# Patient Record
Sex: FEMALE | Race: WHITE | NOT HISPANIC OR LATINO | Employment: FULL TIME | URBAN - METROPOLITAN AREA
[De-identification: names, ages, dates, MRNs, and addresses within clinical notes are randomized per-mention and may not be internally consistent; named-entity substitution may affect disease eponyms.]

---

## 2023-09-06 ENCOUNTER — HOSPITAL ENCOUNTER (OUTPATIENT)
Dept: DATA CONVERSION | Facility: HOSPITAL | Age: 28
Discharge: HOME | End: 2023-09-06
Payer: COMMERCIAL

## 2023-09-06 DIAGNOSIS — M35.9 SYSTEMIC INVOLVEMENT OF CONNECTIVE TISSUE, UNSPECIFIED (MULTI): ICD-10-CM

## 2023-09-06 DIAGNOSIS — R76.8 OTHER SPECIFIED ABNORMAL IMMUNOLOGICAL FINDINGS IN SERUM: ICD-10-CM

## 2023-09-06 DIAGNOSIS — M06.09 RHEUMATOID ARTHRITIS WITHOUT RHEUMATOID FACTOR, MULTIPLE SITES (MULTI): ICD-10-CM

## 2023-09-06 LAB
ALBUMIN SERPL-MCNC: 4.3 GM/DL (ref 3.5–5)
ALBUMIN/GLOB SERPL: 1.5 RATIO (ref 1.5–3)
ALP BLD-CCNC: 76 U/L (ref 35–125)
ALT SERPL-CCNC: 68 U/L (ref 5–40)
ANION GAP SERPL CALCULATED.3IONS-SCNC: 14 MMOL/L (ref 0–19)
AST SERPL-CCNC: 41 U/L (ref 5–40)
BACTERIA UR QL AUTO: POSITIVE
BASOPHILS # BLD AUTO: 0.03 K/UL (ref 0–0.22)
BASOPHILS NFR BLD AUTO: 0.4 % (ref 0–1)
BILIRUB SERPL-MCNC: 0.4 MG/DL (ref 0.1–1.2)
BILIRUB UR QL STRIP.AUTO: NEGATIVE
BUN SERPL-MCNC: 10 MG/DL (ref 8–25)
BUN/CREAT SERPL: 14.3 RATIO (ref 8–21)
C3 SERPL-MCNC: 160 MG/DL (ref 68–260)
C4 SERPL-MCNC: 33 MG/DL (ref 16–64)
CALCIUM SERPL-MCNC: 9.4 MG/DL (ref 8.5–10.4)
CHLORIDE SERPL-SCNC: 104 MMOL/L (ref 97–107)
CK SERPL-CCNC: 78 U/L (ref 24–195)
CLARITY UR: CLEAR
CO2 SERPL-SCNC: 23 MMOL/L (ref 24–31)
COLOR UR: YELLOW
CREAT SERPL-MCNC: 0.7 MG/DL (ref 0.4–1.6)
CREAT UR-MCNC: 276.9 MG/DL
CRP SERPL-MCNC: 0.3 MG/DL (ref 0–2)
DEPRECATED RDW RBC AUTO: 38.5 FL (ref 37–54)
DIFFERENTIAL METHOD BLD: ABNORMAL
EOSINOPHIL # BLD AUTO: 0.1 K/UL (ref 0–0.45)
EOSINOPHIL NFR BLD: 1.3 % (ref 0–3)
ERYTHROCYTE [DISTWIDTH] IN BLOOD BY AUTOMATED COUNT: 12.3 % (ref 11.7–15)
ERYTHROCYTE [SEDIMENTATION RATE] IN BLOOD BY WESTERGREN METHOD: 13 MM/HR (ref 0–20)
GFR SERPL CREATININE-BSD FRML MDRD: 120 ML/MIN/1.73 M2
GLOBULIN SER-MCNC: 2.9 G/DL (ref 1.9–3.7)
GLUCOSE SERPL-MCNC: 88 MG/DL (ref 65–99)
GLUCOSE UR STRIP.AUTO-MCNC: NEGATIVE MG/DL
HCT VFR BLD AUTO: 43.3 % (ref 36–44)
HGB BLD-MCNC: 14.8 GM/DL (ref 12–15)
HGB UR QL STRIP.AUTO: 7 /HPF (ref 0–3)
HGB UR QL: NEGATIVE
HYALINE CASTS UR QL AUTO: 9 /LPF
IMM GRANULOCYTES # BLD AUTO: 0.03 K/UL (ref 0–0.1)
KETONES UR QL STRIP.AUTO: NEGATIVE
LEUKOCYTE ESTERASE UR QL STRIP.AUTO: NEGATIVE
LYMPHOCYTES # BLD AUTO: 3.22 K/UL (ref 1.2–3.2)
LYMPHOCYTES NFR BLD MANUAL: 41.9 % (ref 20–40)
MCH RBC QN AUTO: 29.4 PG (ref 26–34)
MCHC RBC AUTO-ENTMCNC: 34.2 % (ref 31–37)
MCV RBC AUTO: 86.1 FL (ref 80–100)
MICROALBUMIN UR-MCNC: 33 MG/L (ref 0–23)
MICROALBUMIN/CREAT UR: 11.9 MG/G (ref 0–30)
MICROSCOPIC (UA): ABNORMAL
MONOCYTES # BLD AUTO: 0.71 K/UL (ref 0–0.8)
MONOCYTES NFR BLD MANUAL: 9.2 % (ref 0–8)
NEUTROPHILS # BLD AUTO: 3.6 K/UL
NEUTROPHILS # BLD AUTO: 3.6 K/UL (ref 1.8–7.7)
NEUTROPHILS.IMMATURE NFR BLD: 0.4 % (ref 0–1)
NEUTS SEG NFR BLD: 46.8 % (ref 50–70)
NITRITE UR QL STRIP.AUTO: NEGATIVE
NRBC BLD-RTO: 0 /100 WBC
PH UR STRIP.AUTO: 6.5 [PH] (ref 4.6–8)
PLATELET # BLD AUTO: 236 K/UL (ref 150–450)
PMV BLD AUTO: 10.7 CU (ref 7–12.6)
POTASSIUM SERPL-SCNC: 4.1 MMOL/L (ref 3.4–5.1)
PROT SERPL-MCNC: 7.2 G/DL (ref 5.9–7.9)
PROT UR STRIP.AUTO-MCNC: 70 MG/DL
RBC # BLD AUTO: 5.03 M/UL (ref 4–4.9)
REF LAB TEST RESULTS: NORMAL
SODIUM SERPL-SCNC: 141 MMOL/L (ref 133–145)
SP GR UR STRIP.AUTO: 1.03 (ref 1–1.03)
SQUAMOUS UR QL AUTO: ABNORMAL /HPF
URINE CULTURE: ABNORMAL
UROBILINOGEN UR QL STRIP.AUTO: 2 MG/DL (ref 0–1)
WBC # BLD AUTO: 7.7 K/UL (ref 4.5–11)
WBC #/AREA URNS AUTO: 3 /HPF (ref 0–3)

## 2023-09-07 LAB — DSDNA AB SER IA-ACNC: NORMAL [IU]/ML

## 2023-09-09 LAB
HERPES SIMPLEX VIRUS 1 IGG: ABNORMAL
HERPES SIMPLEX VIRUS 2 IGG: ABNORMAL
Lab: ABNORMAL
Lab: ABNORMAL

## 2023-10-11 ENCOUNTER — APPOINTMENT (OUTPATIENT)
Dept: PRIMARY CARE | Facility: CLINIC | Age: 28
End: 2023-10-11
Payer: COMMERCIAL

## 2023-10-16 ENCOUNTER — ANCILLARY PROCEDURE (OUTPATIENT)
Dept: RADIOLOGY | Facility: CLINIC | Age: 28
End: 2023-10-16
Payer: COMMERCIAL

## 2023-10-16 ENCOUNTER — APPOINTMENT (OUTPATIENT)
Dept: RADIOLOGY | Facility: CLINIC | Age: 28
End: 2023-10-16
Payer: COMMERCIAL

## 2023-10-16 DIAGNOSIS — R10.9 UNSPECIFIED ABDOMINAL PAIN: ICD-10-CM

## 2023-10-16 PROCEDURE — 76770 US EXAM ABDO BACK WALL COMP: CPT | Performed by: RADIOLOGY

## 2023-10-16 PROCEDURE — 76770 US EXAM ABDO BACK WALL COMP: CPT

## 2023-11-10 NOTE — PROGRESS NOTES
Chanel Mata female   1995 28 y.o.   [id number]      Chief Complaint    Follow-up          HPI  Chanel Mata is a 28 y.o.  [facility] followed in the [facility] for breast fibroadenomas. 2023 she was seen in the Emergency Department visit for lupus flare up and CT was performed noting incidental breast masses which prompted breast imaging. 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 She has intermittent breast dull aching pain. She notes right nipple skin cyst/pore with occasional white discharge. She has family history of breast cancer in paternal aunt in her late 40s, recurrence in her 60s.    BREAST IMAGIN2023 bilateral diagnostic mammogram at [facility], BI-RADS Category 0, bilateral breast masses.  2023 bilateral breast ultrasound at [facility], BI-RADS Category 3, right breast 9:00 mid depth 1.4 x 1.4 x 0.8cm probable benign fibroadenoma, left breast 9:00 mid depth 1.9 x 1.2 x 0.5cm probable benign fibroadenoma, follow up bilateral ultrasound is recommended    REPRODUCTIVE HISTORY: menarche age 6m nullipara, premenopausal with PCOS, current OCPs, LMP 2023, 3 right benign excisions for juvenile fibroadenomas    FAMILY CANCER HISTORY  Paternal aunt: breast cancer in her late 40s, recurrence in her 60s.  Father: HSV related throat cance      REVIEW OF SYSTEMS    Constitutional:  Negative for appetite change, fatigue, fever and unexpected weight change.   HENT:  Negative for ear pain, hearing loss, nosebleeds, sore throat and trouble swallowing.    Eyes:  Negative for discharge, itching and visual disturbance.   Respiratory:  Negative for cough, chest tightness and shortness of breath.    Cardiovascular:  Negative for chest pain, palpitations and leg swelling.   Breast: as indicated in HPI  Gastrointestinal:  Negative for abdominal pain, constipation, diarrhea and nausea.    Endocrine: Negative for cold intolerance and heat intolerance.   Genitourinary:  Negative for dysuria, frequency, hematuria, pelvic pain and vaginal bleeding.   Musculoskeletal:  Negative for arthralgias, back pain, gait problem, joint swelling and myalgias.   Skin:  Negative for color change and rash.   Allergic/Immunologic: Negative for environmental allergies and food allergies.   Neurological:  Negative for dizziness, tremors, speech difficulty, weakness, numbness and headaches.   Hematological:  Does not bruise/bleed easily.   Psychiatric/Behavioral:  Negative for agitation, dysphoric mood and sleep disturbance. The patient is not nervous/anxious.         MEDICATIONS  Current Outpatient Medications   Medication Instructions    Apri 0.15-0.03 mg tablet 1 tablet, oral, Daily    dextromethorphan HBr/bupropion (AUVELITY ORAL) oral    hydrOXYzine HCL (ATARAX) 25 mg, oral, As needed    omeprazole (PriLOSEC) 40 mg DR capsule Every 24 hours    PlaqueniL 200 mg tablet 200 mg Orally twice daily for 90 days    prazosin (Minipress) 1 mg capsule Every 24 hours    predniSONE (DELTASONE) 10 mg, oral, Daily    propranolol (INDERAL) 10 mg, oral, 2 times daily PRN    Vyvanse 60 mg capsule Every 24 hours        ALLERGIES  Allergies   Allergen Reactions    Ciprofloxacin Other     tendinitis    Doxycycline Rash    Penicillins Rash    Sulfa (Sulfonamide Antibiotics) Rash        SOCIAL HISTORY    No family history on file.      Social History     Tobacco Use    Smoking status: Never    Smokeless tobacco: Not on file   Substance Use Topics    Alcohol use: Never        VITALS  Vitals:    11/14/23 1414   BP: (!) 140/106   Pulse: (!) 114        PHYSICAL EXAM  Patient is alert and oriented x3, with appropriate mood. Her gait is steady and hand grasps are equal. Sclera clear. The breasts are large and fairly symmetrical. The right breast with 3 healed incisions, superior central, central lateral near areola and partial circumareolar. I  am not able to appreciate any measurable masses however she does have breast tenderness central lateral of bilateral breasts. The left tissue is soft without palpable abnormalities, discrete nodules or masses. The skin and nipples appear normal, no skin cyst noted, no nipple discharge. There is no cervical, supraclavicular, or axillary lymphadenopathy palpable. Heart rate and rhythm normal, S1 and S2 appreciated. The lungs are clear bilaterally. Abdomen is soft & non-tender.    Physical Exam  Chest:            IMAGING  BI US breast limited bilateral 11/14/2023    Narrative  Interpreted By:  Harleen Mariano,  STUDY:  BI US BREAST LIMITED BILATERAL;  11/14/2023 2:06 pm    ACCESSION NUMBER(S):  [id number]    ORDERING CLINICIAN:  ANDREW OCONNOR    INDICATION:  Patient presents for a short-term follow-up of 2 probably benign  right breast masses and 2 probably benign left breast masses.    COMPARISON:  05/12/2023    FINDINGS:  Targeted ultrasound was performed. In the right breast at the 5  o'clock position 6 cm from the nipple there is an oval circumscribed  hypoechoic mass. The mass measures 1.4 x 0.4 by 0.6 cm. It is soft  with elastography. There is minimal internal vascularity. This mass  is stable. Targeted ultrasound of the right breast at the 4 o'clock  position 9 cm from the nipple demonstrates fibroglandular and fatty  breast tissue. There are no suspicious findings. The previously noted  mass is not seen.    In the right breast at the 8:30 o'clock position 5 cm from the nipple  there is an oval circumscribed hypoechoic mass. The mass measures 1.3  x 0.9 x 1 cm. It is soft with elastography and there is no internal  vascularity. The mass is stable. The previously noted mass in the  left breast at the 8:30 o'clock position 5 cm from the nipple is no  longer seen.    Impression  Probably benign bilateral breast masses. Recommendation is for a 6  month follow-up.    BI-RADS Category:  3 Probably  Benign.  Recommendation:  Short Interval Follow-up.  Recommended Date:  6 Months.  Laterality:  Bilateral    Time was spent viewing digital images of the radiology testing with the patient. I explained the results in depth, along with suggested explanation for follow up recommendations based on the testing results.        ORDERS  Orders Placed This Encounter   Procedures    BI US breast complete bilateral     Standing Status:   Future     Standing Expiration Date:   1/14/2025     Order Specific Question:   Reason for exam:     Answer:   l & r follow up     Order Specific Question:   Radiologist to Determine Optimal Study     Answer:   Yes     Order Specific Question:   Release result to Warp [employer]     Answer:   Immediate [1]     Order Specific Question:   Is this exam part of a Research Study? If Yes, link this order to the research study     Answer:   No           ASSESSMENT/PLAN  1. Fibroadenoma of both breasts  BI US breast complete bilateral    Clinic Appointment Request           Follow up in about 6 months (around 5/14/2024), or with bilateral u/s.      Delisa Marvin, [facility]

## 2023-11-12 ENCOUNTER — APPOINTMENT (OUTPATIENT)
Dept: RADIOLOGY | Facility: HOSPITAL | Age: 28
End: 2023-11-12
Payer: COMMERCIAL

## 2023-11-12 ENCOUNTER — HOSPITAL ENCOUNTER (EMERGENCY)
Facility: HOSPITAL | Age: 28
Discharge: HOME | End: 2023-11-12
Attending: EMERGENCY MEDICINE
Payer: COMMERCIAL

## 2023-11-12 VITALS
SYSTOLIC BLOOD PRESSURE: 141 MMHG | WEIGHT: 225 LBS | TEMPERATURE: 97.3 F | BODY MASS INDEX: 38.41 KG/M2 | OXYGEN SATURATION: 99 % | DIASTOLIC BLOOD PRESSURE: 109 MMHG | HEART RATE: 97 BPM | RESPIRATION RATE: 16 BRPM | HEIGHT: 64 IN

## 2023-11-12 DIAGNOSIS — R00.0 TACHYCARDIA: Primary | ICD-10-CM

## 2023-11-12 LAB
ALBUMIN SERPL-MCNC: 4 G/DL (ref 3.5–5)
ALP BLD-CCNC: 83 U/L (ref 35–125)
ALT SERPL-CCNC: 20 U/L (ref 5–40)
ANION GAP SERPL CALC-SCNC: 12 MMOL/L
AST SERPL-CCNC: 22 U/L (ref 5–40)
BASOPHILS # BLD AUTO: 0.06 X10*3/UL (ref 0–0.1)
BASOPHILS NFR BLD AUTO: 0.7 %
BILIRUB SERPL-MCNC: <0.2 MG/DL (ref 0.1–1.2)
BUN SERPL-MCNC: 13 MG/DL (ref 8–25)
CALCIUM SERPL-MCNC: 9.3 MG/DL (ref 8.5–10.4)
CHLORIDE SERPL-SCNC: 105 MMOL/L (ref 97–107)
CO2 SERPL-SCNC: 22 MMOL/L (ref 24–31)
CREAT SERPL-MCNC: 0.8 MG/DL (ref 0.4–1.6)
D DIMER PPP FEU-MCNC: 0.2 MG/L FEU (ref 0.19–0.5)
EOSINOPHIL # BLD AUTO: 0.16 X10*3/UL (ref 0–0.7)
EOSINOPHIL NFR BLD AUTO: 1.9 %
ERYTHROCYTE [DISTWIDTH] IN BLOOD BY AUTOMATED COUNT: 12.5 % (ref 11.5–14.5)
GFR SERPL CREATININE-BSD FRML MDRD: >90 ML/MIN/1.73M*2
GLUCOSE SERPL-MCNC: 93 MG/DL (ref 65–99)
HCG UR QL IA.RAPID: NEGATIVE
HCT VFR BLD AUTO: 43.4 % (ref 36–46)
HGB BLD-MCNC: 14.9 G/DL (ref 12–16)
IMM GRANULOCYTES # BLD AUTO: 0.02 X10*3/UL (ref 0–0.7)
IMM GRANULOCYTES NFR BLD AUTO: 0.2 % (ref 0–0.9)
LYMPHOCYTES # BLD AUTO: 4.23 X10*3/UL (ref 1.2–4.8)
LYMPHOCYTES NFR BLD AUTO: 49.2 %
MCH RBC QN AUTO: 29.9 PG (ref 26–34)
MCHC RBC AUTO-ENTMCNC: 34.3 G/DL (ref 32–36)
MCV RBC AUTO: 87 FL (ref 80–100)
MONOCYTES # BLD AUTO: 0.75 X10*3/UL (ref 0.1–1)
MONOCYTES NFR BLD AUTO: 8.7 %
NEUTROPHILS # BLD AUTO: 3.38 X10*3/UL (ref 1.2–7.7)
NEUTROPHILS NFR BLD AUTO: 39.3 %
NRBC BLD-RTO: 0 /100 WBCS (ref 0–0)
PLATELET # BLD AUTO: 301 X10*3/UL (ref 150–450)
POTASSIUM SERPL-SCNC: 4.3 MMOL/L (ref 3.4–5.1)
PROT SERPL-MCNC: 7.4 G/DL (ref 5.9–7.9)
RBC # BLD AUTO: 4.99 X10*6/UL (ref 4–5.2)
SODIUM SERPL-SCNC: 139 MMOL/L (ref 133–145)
TSH SERPL DL<=0.05 MIU/L-ACNC: 2.91 MIU/L (ref 0.27–4.2)
WBC # BLD AUTO: 8.6 X10*3/UL (ref 4.4–11.3)

## 2023-11-12 PROCEDURE — 99283 EMERGENCY DEPT VISIT LOW MDM: CPT | Mod: 25 | Performed by: EMERGENCY MEDICINE

## 2023-11-12 PROCEDURE — 85300 ANTITHROMBIN III ACTIVITY: CPT | Performed by: EMERGENCY MEDICINE

## 2023-11-12 PROCEDURE — 81025 URINE PREGNANCY TEST: CPT | Performed by: EMERGENCY MEDICINE

## 2023-11-12 PROCEDURE — 36415 COLL VENOUS BLD VENIPUNCTURE: CPT

## 2023-11-12 PROCEDURE — 84443 ASSAY THYROID STIM HORMONE: CPT | Performed by: EMERGENCY MEDICINE

## 2023-11-12 PROCEDURE — 80053 COMPREHEN METABOLIC PANEL: CPT

## 2023-11-12 PROCEDURE — 99285 EMERGENCY DEPT VISIT HI MDM: CPT | Performed by: EMERGENCY MEDICINE

## 2023-11-12 PROCEDURE — 85025 COMPLETE CBC W/AUTO DIFF WBC: CPT

## 2023-11-12 ASSESSMENT — PAIN SCALES - GENERAL: PAINLEVEL_OUTOF10: 0 - NO PAIN

## 2023-11-12 ASSESSMENT — PAIN - FUNCTIONAL ASSESSMENT: PAIN_FUNCTIONAL_ASSESSMENT: 0-10

## 2023-11-12 ASSESSMENT — LIFESTYLE VARIABLES
REASON UNABLE TO ASSESS: NO
HAVE YOU EVER FELT YOU SHOULD CUT DOWN ON YOUR DRINKING: NO
HAVE PEOPLE ANNOYED YOU BY CRITICIZING YOUR DRINKING: NO
EVER HAD A DRINK FIRST THING IN THE MORNING TO STEADY YOUR NERVES TO GET RID OF A HANGOVER: NO
EVER FELT BAD OR GUILTY ABOUT YOUR DRINKING: NO

## 2023-11-13 NOTE — DISCHARGE INSTRUCTIONS
You had a very high heart rate today.  Your heart rate was in the 150s.  You should follow-up with your heart doctor tomorrow.  Give them a call.  Let them know that there are tests available in the ER from your visit tonight.    You did not complete your evaluation for fast heart rate.  Return at any time if you want to complete that evaluation, you are unable to see your heart doctor immediately or if your symptoms get worse including continued fast heart rate, chest pain, difficulty breathing, fever or new symptoms.

## 2023-11-13 NOTE — ED PROVIDER NOTES
HPI   Chief Complaint   Patient presents with    Rapid Heart Rate       -year-old female presents with 2 days of rapid heart rate.  Started yesterday.  Did not measure her heart rate.  This morning patient did not feel well.  And put on her Apple Watch.  Apple Watch said her heart rate has been 150 all day.  No history of palpitations or tachycardia.  No chest pain or shortness of breath.  No fever.  No cough or wheezing.  Patient recently had upper respiratory infection that was treated with azithromycin about a week ago.  All symptoms resolved.    No nausea vomiting or diarrhea.  No abdominal pain.  No vaginal rectal bleeding.  No anticoagulation.    She does not smoke or use cocaine.  She has ADHD and takes Vyvanse.  Did not take her Vyvanse today because she felt the tachycardia.    No history of PE or DVT.  No risk factors for for DVT or PE including recent surgery, immobilization or cancer.    History of thyroid disease.  No neck pain or swelling.    Symptoms feel better now.  Did not take anything for her symptoms.                          Arelis Coma Scale Score: 15                  Patient History   Past Medical History:   Diagnosis Date    Chronic post-traumatic stress disorder     Personal history of other mental and behavioral disorders 08/23/2017    History of anxiety disorder    Personal history of other specified conditions 08/23/2017    History of headache     Past Surgical History:   Procedure Laterality Date    BREAST LUMPECTOMY  06/21/2018    Breast Surgery Lumpectomy    INNER EAR SURGERY  08/23/2017    Inner Ear Surgery    TONSILLECTOMY  08/23/2017    Tonsillectomy     No family history on file.  Social History     Tobacco Use    Smoking status: Never    Smokeless tobacco: Not on file   Substance Use Topics    Alcohol use: Never    Drug use: Never       Physical Exam   ED Triage Vitals   Temp Heart Rate Resp BP   11/12/23 1955 11/12/23 1956 11/12/23 1955 11/12/23 1956   36.3 °C (97.3 °F) 103 18  (!) 141/109      SpO2 Temp Source Heart Rate Source Patient Position   11/12/23 1956 11/12/23 1955 11/12/23 1955 --   100 % Temporal Monitor       BP Location FiO2 (%)     -- --             Physical Exam  Vitals and nursing note reviewed.     Constitutional: Well appearing and hydrated. Awake & alert. No acute distress.  Head: Atraumatic.  Eyes: Sclerae are anicteric and not injected.  ENT: Airway is patent.  Oropharynx clear  Neck: Neck is supple and non-tender. No stridor.  No anterior neck tenderness or mass.  Cardiovascular: Heart rate 103.  Increased to 105 with sitting up.  Pulmonary/Chest: Clear to auscultation bilaterally. No distress.  Normal work of breathing.  No acute tachypnea.  100% on room air, no hypoxia  GI: Soft and non-distended. There is no discomfort with palpation.   Extremities: Full range of motion in all extremities and no deformity.  No calf tenderness or swelling.  Neurological: Alert, awake.  Moving all extremities.  Skin: Skin is warm and dry.  Psychiatric: Cooperative.    EKG:  interpreted by me, Emergency Department Physician    1.  Sinus tachycardia 105, no ST elevations, no prior    ED Course & MDM   ED Course as of 11/13/23 0642   Cath[place]in Nov 12, 2023 2205 Discussed all test results available patient.  Patient does not want to continue evaluation.  Patient and mother are agreement with going home.  She will follow-up with her cardiologist [RF]      ED Course User Index  [RF] Jamal Campbell MD         Diagnoses as of 11/13/23 0642   Tachycardia       Medical Decision Making  Tachycardia: The patient presents with tachycardia of uncertain etiology. Based on their history, lab analysis and EKG, in addition to the patient's reassuring physical exam, I see no evidence at this time for a malignant etiology for the patient's chest pain.    There is no acute evidence for pulmonary embolus (Well's score low, negative D-dimer), malignant cardiac, pulmonary or aortic pathology, or  the other malignant etiologies for their chest pain.    No metabolic or electrolyte abnormalities.  TSH normal.  No signs of thyroid disease.  No anemia.      While in the ER heart rate was 104 at the highest.  Mostly under 100.  No severe tachycardia.  Because of palpitations unknown.  Recommend that she follow-up with a cardiologist.  Patient has a cardiologist and will follow-up on Monday.    Not want to stay for any further evaluation and or the results of her labs.  Results of her labs will be available through [employer].  Reviewed labs.  Labs are all normal.    The patient and mother understand that at this time there is no evidence for a more malignant underlying process, but the patient also understands that early in the process of an illness, an emergency department workup can be falsely reassuring. Routine discharge counseling was given to the patient and the patient understands that worsening, changing or persistent symptoms should prompt an immediate call or follow up with their primary physician or the  emergency department immediately.    Discharged home with mother.  Patient stable.  Will return to the ER if her symptoms do not improve in 1 to 2 days or get worse including continued tachycardia, chest pain, shortness of breath or fever.        Procedure  Procedures     Jamal Campbell MD  11/13/23 2156

## 2023-11-14 ENCOUNTER — OFFICE VISIT (OUTPATIENT)
Dept: SURGICAL ONCOLOGY | Facility: CLINIC | Age: 28
End: 2023-11-14
Payer: COMMERCIAL

## 2023-11-14 ENCOUNTER — ANCILLARY PROCEDURE (OUTPATIENT)
Dept: RADIOLOGY | Facility: CLINIC | Age: 28
End: 2023-11-14
Payer: COMMERCIAL

## 2023-11-14 VITALS
BODY MASS INDEX: 39.85 KG/M2 | SYSTOLIC BLOOD PRESSURE: 140 MMHG | HEIGHT: 64 IN | WEIGHT: 233.4 LBS | HEART RATE: 114 BPM | DIASTOLIC BLOOD PRESSURE: 106 MMHG

## 2023-11-14 DIAGNOSIS — N63.20 UNSPECIFIED LUMP IN THE LEFT BREAST, UNSPECIFIED QUADRANT: ICD-10-CM

## 2023-11-14 DIAGNOSIS — N63.10 UNSPECIFIED LUMP IN THE RIGHT BREAST, UNSPECIFIED QUADRANT: ICD-10-CM

## 2023-11-14 DIAGNOSIS — D24.1 FIBROADENOMA OF BOTH BREASTS: Primary | ICD-10-CM

## 2023-11-14 DIAGNOSIS — D24.2 FIBROADENOMA OF BOTH BREASTS: Primary | ICD-10-CM

## 2023-11-14 PROBLEM — R55 SYNCOPE: Status: ACTIVE | Noted: 2023-11-14

## 2023-11-14 PROBLEM — J01.00 ACUTE MAXILLARY SINUSITIS: Status: ACTIVE | Noted: 2022-10-21

## 2023-11-14 PROBLEM — L57.8 SOLAR DERMATITIS: Status: ACTIVE | Noted: 2023-11-14

## 2023-11-14 PROBLEM — R79.89 ABNORMAL C-REACTIVE PROTEIN: Status: ACTIVE | Noted: 2023-11-14

## 2023-11-14 PROBLEM — S93.521A SPRAIN OF METATARSOPHALANGEAL JOINT OF RIGHT GREAT TOE: Status: ACTIVE | Noted: 2020-09-10

## 2023-11-14 PROBLEM — N12 PYELONEPHRITIS: Status: ACTIVE | Noted: 2019-03-26

## 2023-11-14 PROBLEM — R11.2 NAUSEA AND VOMITING: Status: ACTIVE | Noted: 2022-07-19

## 2023-11-14 PROBLEM — R41.3 MEMORY LOSS: Status: ACTIVE | Noted: 2022-11-14

## 2023-11-14 PROBLEM — R92.8 ABNORMAL FINDINGS ON DIAGNOSTIC IMAGING OF BREAST: Status: ACTIVE | Noted: 2023-11-14

## 2023-11-14 PROBLEM — L56.8 PHOTOSENSITIVITY OF SKIN: Status: ACTIVE | Noted: 2023-11-14

## 2023-11-14 PROBLEM — R10.9 FLANK PAIN: Status: ACTIVE | Noted: 2022-07-19

## 2023-11-14 PROBLEM — K64.9 HEMORRHOID: Status: ACTIVE | Noted: 2022-05-17

## 2023-11-14 PROBLEM — M35.9 UNDIFFERENTIATED CONNECTIVE TISSUE DISEASE (MULTI): Status: ACTIVE | Noted: 2023-11-14

## 2023-11-14 PROBLEM — S93.529A TURF TOE: Status: ACTIVE | Noted: 2020-09-08

## 2023-11-14 PROBLEM — L20.84 INTRINSIC ECZEMA: Status: ACTIVE | Noted: 2023-11-14

## 2023-11-14 PROBLEM — R33.9 URINARY RETENTION WITH INCOMPLETE BLADDER EMPTYING: Status: ACTIVE | Noted: 2023-11-14

## 2023-11-14 PROBLEM — S90.421A INFECTED BLISTER OF GREAT TOE OF RIGHT FOOT: Status: ACTIVE | Noted: 2020-09-08

## 2023-11-14 PROBLEM — N39.8 DYSFUNCTIONAL VOIDING OF URINE: Status: ACTIVE | Noted: 2023-11-14

## 2023-11-14 PROBLEM — R34 DECREASED URINE OUTPUT: Status: ACTIVE | Noted: 2018-11-12

## 2023-11-14 PROBLEM — M06.9 RHEUMATOID ARTHRITIS (MULTI): Status: ACTIVE | Noted: 2023-04-30

## 2023-11-14 PROBLEM — N39.0 ACUTE URINARY TRACT INFECTION: Status: ACTIVE | Noted: 2023-11-14

## 2023-11-14 PROBLEM — R10.9 ABDOMINAL PAIN: Status: ACTIVE | Noted: 2018-10-14

## 2023-11-14 PROBLEM — F41.8 DEPRESSION WITH ANXIETY: Status: ACTIVE | Noted: 2023-11-14

## 2023-11-14 PROBLEM — E78.5 HYPERLIPIDEMIA: Status: ACTIVE | Noted: 2019-05-22

## 2023-11-14 PROBLEM — F98.8 ADD (ATTENTION DEFICIT DISORDER): Status: ACTIVE | Noted: 2023-03-29

## 2023-11-14 PROBLEM — G47.33 OBSTRUCTIVE SLEEP APNEA: Status: ACTIVE | Noted: 2021-12-03

## 2023-11-14 PROBLEM — L03.031 PARONYCHIA OF GREAT TOE, RIGHT: Status: ACTIVE | Noted: 2020-09-08

## 2023-11-14 PROBLEM — J45.909 ASTHMA (HHS-HCC): Status: ACTIVE | Noted: 2023-11-14

## 2023-11-14 PROBLEM — R53.83 FATIGUE: Status: ACTIVE | Noted: 2018-11-12

## 2023-11-14 PROBLEM — N76.0 BACTERIAL VAGINOSIS: Status: ACTIVE | Noted: 2022-05-31

## 2023-11-14 PROBLEM — N64.3 GALACTORRHEA: Status: ACTIVE | Noted: 2023-05-08

## 2023-11-14 PROBLEM — E66.811 OBESITY, CLASS I, BMI 30-34.9: Status: ACTIVE | Noted: 2019-03-27

## 2023-11-14 PROBLEM — R06.83 SNORING: Status: ACTIVE | Noted: 2021-10-22

## 2023-11-14 PROBLEM — R82.90 ABNORMAL URINE FINDING: Status: ACTIVE | Noted: 2018-10-11

## 2023-11-14 PROBLEM — N89.8 VAGINAL DISCHARGE: Status: ACTIVE | Noted: 2022-05-31

## 2023-11-14 PROBLEM — L08.9 INFECTED BLISTER OF GREAT TOE OF RIGHT FOOT: Status: ACTIVE | Noted: 2020-09-08

## 2023-11-14 PROBLEM — R53.1 WEAKNESS: Status: ACTIVE | Noted: 2022-11-14

## 2023-11-14 PROBLEM — N39.0 ACUTE UTI: Status: ACTIVE | Noted: 2018-10-11

## 2023-11-14 PROBLEM — M06.09 POLYARTHRITIS WITH NEGATIVE RHEUMATOID FACTOR (MULTI): Status: ACTIVE | Noted: 2023-11-14

## 2023-11-14 PROBLEM — R73.01 IFG (IMPAIRED FASTING GLUCOSE): Status: ACTIVE | Noted: 2021-02-22

## 2023-11-14 PROBLEM — F43.21 GRIEF: Status: ACTIVE | Noted: 2022-10-21

## 2023-11-14 PROBLEM — R21 RASH: Status: ACTIVE | Noted: 2020-08-21

## 2023-11-14 PROBLEM — E28.2 PCOS (POLYCYSTIC OVARIAN SYNDROME): Status: ACTIVE | Noted: 2021-09-19

## 2023-11-14 PROBLEM — R31.9 HEMATURIA: Status: ACTIVE | Noted: 2018-10-14

## 2023-11-14 PROBLEM — N92.6 IRREGULAR PERIODS/MENSTRUAL CYCLES: Status: ACTIVE | Noted: 2023-11-14

## 2023-11-14 PROBLEM — R79.89 ELEVATED LFTS: Status: ACTIVE | Noted: 2019-03-05

## 2023-11-14 PROBLEM — R10.2 FEMALE PELVIC PAIN: Status: ACTIVE | Noted: 2023-11-14

## 2023-11-14 PROBLEM — J20.9 ACUTE BRONCHITIS: Status: ACTIVE | Noted: 2022-10-21

## 2023-11-14 PROBLEM — E66.9 OBESITY, CLASS I, BMI 30-34.9: Status: ACTIVE | Noted: 2019-03-27

## 2023-11-14 PROBLEM — N39.0 RECURRENT UTI (URINARY TRACT INFECTION): Status: ACTIVE | Noted: 2019-04-14

## 2023-11-14 PROBLEM — N91.2 AMENORRHEA: Status: ACTIVE | Noted: 2023-04-30

## 2023-11-14 PROBLEM — R30.0 DYSURIA: Status: ACTIVE | Noted: 2019-01-21

## 2023-11-14 PROBLEM — Z20.822 EXPOSURE TO COVID-19 VIRUS: Status: ACTIVE | Noted: 2020-11-24

## 2023-11-14 PROBLEM — R41.0 CONFUSION: Status: ACTIVE | Noted: 2022-11-14

## 2023-11-14 PROBLEM — R40.0 DAYTIME SOMNOLENCE: Status: ACTIVE | Noted: 2021-09-19

## 2023-11-14 PROBLEM — L25.9 CONTACT DERMATITIS: Status: ACTIVE | Noted: 2020-08-27

## 2023-11-14 PROBLEM — R05.9 COUGH: Status: ACTIVE | Noted: 2018-12-25

## 2023-11-14 PROBLEM — E55.9 VITAMIN D DEFICIENCY: Status: ACTIVE | Noted: 2021-02-16

## 2023-11-14 PROBLEM — R68.83 CHILLS: Status: ACTIVE | Noted: 2020-11-24

## 2023-11-14 PROBLEM — E04.9 GOITER: Status: ACTIVE | Noted: 2018-11-12

## 2023-11-14 PROBLEM — E04.0 NONTOXIC DIFFUSE GOITER: Status: ACTIVE | Noted: 2023-11-14

## 2023-11-14 PROBLEM — Z97.8 FOLEY CATHETER PRESENT: Status: ACTIVE | Noted: 2023-11-14

## 2023-11-14 PROBLEM — B96.89 BACTERIAL VAGINOSIS: Status: ACTIVE | Noted: 2022-05-31

## 2023-11-14 PROBLEM — B37.31 VULVOVAGINAL CANDIDIASIS: Status: ACTIVE | Noted: 2019-02-10

## 2023-11-14 PROBLEM — R39.89 URETHRAL PAIN: Status: ACTIVE | Noted: 2022-07-05

## 2023-11-14 PROBLEM — R00.0 TACHYCARDIA: Status: ACTIVE | Noted: 2022-07-01

## 2023-11-14 PROCEDURE — 99213 OFFICE O/P EST LOW 20 MIN: CPT | Performed by: NURSE PRACTITIONER

## 2023-11-14 PROCEDURE — 76642 ULTRASOUND BREAST LIMITED: CPT

## 2023-11-14 PROCEDURE — 76642 ULTRASOUND BREAST LIMITED: CPT | Performed by: RADIOLOGY

## 2023-11-14 RX ORDER — DIAZEPAM 5 MG/1
TABLET ORAL EVERY 24 HOURS
COMMUNITY
Start: 2022-07-14 | End: 2023-11-14 | Stop reason: WASHOUT

## 2023-11-14 RX ORDER — DULOXETINE HYDROCHLORIDE 60 MG/1
1 CAPSULE, DELAYED RELEASE ORAL EVERY 24 HOURS
COMMUNITY
End: 2023-11-14 | Stop reason: WASHOUT

## 2023-11-14 RX ORDER — OMEPRAZOLE 40 MG/1
CAPSULE, DELAYED RELEASE ORAL EVERY 24 HOURS
COMMUNITY
Start: 2023-04-14 | End: 2024-03-28

## 2023-11-14 RX ORDER — HYDROXYZINE HYDROCHLORIDE 25 MG/1
25 TABLET, FILM COATED ORAL AS NEEDED
COMMUNITY
Start: 2022-05-24

## 2023-11-14 RX ORDER — LISDEXAMFETAMINE DIMESYLATE 60 MG/1
CAPSULE ORAL EVERY 24 HOURS
COMMUNITY

## 2023-11-14 RX ORDER — PREDNISONE 10 MG/1
10 TABLET ORAL DAILY
COMMUNITY
Start: 2023-04-30

## 2023-11-14 RX ORDER — PRAZOSIN HYDROCHLORIDE 1 MG/1
CAPSULE ORAL EVERY 24 HOURS
COMMUNITY
Start: 2021-09-10

## 2023-11-14 RX ORDER — NORGESTIMATE AND ETHINYL ESTRADIOL 0.25-0.035
KIT ORAL
COMMUNITY
Start: 2017-07-31 | End: 2023-11-14 | Stop reason: WASHOUT

## 2023-11-14 RX ORDER — VENLAFAXINE 100 MG/1
TABLET ORAL EVERY 24 HOURS
COMMUNITY
Start: 2022-07-18 | End: 2023-11-14 | Stop reason: WASHOUT

## 2023-11-14 RX ORDER — DESVENLAFAXINE SUCCINATE 100 MG/1
TABLET, EXTENDED RELEASE ORAL EVERY 24 HOURS
COMMUNITY
End: 2023-11-14 | Stop reason: WASHOUT

## 2023-11-14 RX ORDER — CYPROHEPTADINE HYDROCHLORIDE 4 MG/1
TABLET ORAL EVERY 12 HOURS
COMMUNITY
Start: 2017-08-23 | End: 2023-11-14 | Stop reason: WASHOUT

## 2023-11-14 RX ORDER — NITROFURANTOIN 25; 75 MG/1; MG/1
CAPSULE ORAL EVERY 12 HOURS
COMMUNITY
End: 2023-11-14 | Stop reason: WASHOUT

## 2023-11-14 RX ORDER — IBUPROFEN 400 MG/1
1 TABLET ORAL EVERY 6 HOURS PRN
COMMUNITY
Start: 2012-02-07 | End: 2023-11-14 | Stop reason: WASHOUT

## 2023-11-14 RX ORDER — FLUOXETINE HYDROCHLORIDE 20 MG/1
1 CAPSULE ORAL EVERY 24 HOURS
COMMUNITY
End: 2023-11-14 | Stop reason: WASHOUT

## 2023-11-14 RX ORDER — PHENAZOPYRIDINE HYDROCHLORIDE 200 MG/1
TABLET, FILM COATED ORAL EVERY 8 HOURS
COMMUNITY
End: 2023-11-14 | Stop reason: WASHOUT

## 2023-11-14 RX ORDER — PROPRANOLOL HYDROCHLORIDE 10 MG/1
10 TABLET ORAL 2 TIMES DAILY PRN
COMMUNITY
Start: 2023-09-21

## 2023-11-14 RX ORDER — BUPROPION HCL 150 MG
TABLET, EXTENDED RELEASE 24 HR ORAL EVERY 24 HOURS
COMMUNITY
End: 2023-11-14 | Stop reason: WASHOUT

## 2023-11-14 RX ORDER — METRONIDAZOLE 500 MG/1
TABLET ORAL EVERY 8 HOURS
COMMUNITY
Start: 2022-07-15 | End: 2023-11-14 | Stop reason: WASHOUT

## 2023-11-14 RX ORDER — TAMSULOSIN HYDROCHLORIDE 0.4 MG/1
1 CAPSULE ORAL EVERY 24 HOURS
COMMUNITY
Start: 2022-07-18 | End: 2023-11-14 | Stop reason: WASHOUT

## 2023-11-14 RX ORDER — HYDROXYCHLOROQUINE SULFATE 200 MG/1
TABLET ORAL
COMMUNITY
Start: 2023-03-24 | End: 2024-03-20 | Stop reason: SDUPTHER

## 2023-11-14 ASSESSMENT — PAIN SCALES - GENERAL: PAINLEVEL: 0-NO PAIN

## 2023-11-15 ENCOUNTER — OFFICE VISIT (OUTPATIENT)
Dept: PRIMARY CARE | Facility: CLINIC | Age: 28
End: 2023-11-15
Payer: COMMERCIAL

## 2023-11-15 ENCOUNTER — HOSPITAL ENCOUNTER (OUTPATIENT)
Dept: CARDIOLOGY | Facility: HOSPITAL | Age: 28
Discharge: HOME | End: 2023-11-15
Payer: COMMERCIAL

## 2023-11-15 VITALS
OXYGEN SATURATION: 99 % | BODY MASS INDEX: 40.85 KG/M2 | DIASTOLIC BLOOD PRESSURE: 74 MMHG | SYSTOLIC BLOOD PRESSURE: 122 MMHG | HEART RATE: 78 BPM | WEIGHT: 238 LBS

## 2023-11-15 DIAGNOSIS — R61 EXCESSIVE SWEATING: Primary | ICD-10-CM

## 2023-11-15 DIAGNOSIS — R00.0 TACHYCARDIA: ICD-10-CM

## 2023-11-15 LAB
ATRIAL RATE: 105 BPM
P AXIS: 30 DEGREES
P OFFSET: 204 MS
P ONSET: 156 MS
PR INTERVAL: 130 MS
Q ONSET: 221 MS
QRS COUNT: 17 BEATS
QRS DURATION: 74 MS
QT INTERVAL: 328 MS
QTC CALCULATION(BAZETT): 433 MS
QTC FREDERICIA: 395 MS
R AXIS: 4 DEGREES
T AXIS: 20 DEGREES
T OFFSET: 385 MS
VENTRICULAR RATE: 105 BPM

## 2023-11-15 PROCEDURE — 93005 ELECTROCARDIOGRAM TRACING: CPT

## 2023-11-15 PROCEDURE — 99213 OFFICE O/P EST LOW 20 MIN: CPT | Performed by: NURSE PRACTITIONER

## 2023-11-15 PROCEDURE — 1036F TOBACCO NON-USER: CPT | Performed by: NURSE PRACTITIONER

## 2023-11-15 ASSESSMENT — PAIN SCALES - GENERAL: PAINLEVEL: 0-NO PAIN

## 2023-11-15 NOTE — PROGRESS NOTES
Subjective   Patient ID: Chanel Mata is a 28 y.o. female who presents for Night Sweats (Excessive sweating X1 year. Interested in autonomic testing.).    MARLEY Buchanan is a 28-year-old female who presents with ongoing concerns Over the past year.  She has a variety of symptoms which are debilitating.  Has excessive sweating, heat intolerance (cannot  shower), elevated heart rate as high as 140-150 during the day, overwhelming fatigue  She is concerned she may have an autonomic disorder  She has seen cardiology EP Dr Clements for tachycardia - States her work-up has been normal  She has seen rheumatology Dr Apple for Lupus  She is presently seeing psychiatry    Review of Systems  Constitutional Symptoms: Negative for fever, loss of appetite,  Positive for headaches, fatigue.   Cardiovascular: Negative for chest pain/pressure, edema Positive for palpitations and tachycardia  Respiratory: Negative for shortness of breath, dyspnea on exertion, pain with breathing, coughing.   Gastrointestinal: Negative for nausea, vomiting, abdominal pain, change in bowel habits  Musculoskeletal: Negative for  joint swelling, cramps. Positive for joint pain and myalgias  Integumentary: Negative for skin trouble or rash.   Neurological: Negative for headache, numbness, tingling, weakness, tremors.   Psychiatric: Positive for depression, anxiety.   Endocrine: Negative for weight gain, polyuria, polydipsia, polyphagia. Positive for excessive sweating, temperature intolerance  Hematologic/Lymphatic: Negative for bruising, abnormal bleeding, swollen glands.    Objective   /74   Pulse 78   Wt 108 kg (238 lb)   SpO2 99%   BMI 40.85 kg/m²     Physical Exam  Alert and oriented x3, no acute distress  Head NC/AT, hair normal pattern. Face without deficiency  Neck supple without lymphadenopathy.  Heart with regular rate and rhythm.  No edema  Lungs clear to auscultation bilaterally  Skin warm and dry without rash  Cranial  nerves II through XII grossly intact. Moves all extremities equally.  Gait steady    Assessment/Plan   Diagnoses and all orders for this visit:  Excessive sweating  -     Referral to Neurology; Future  Tachycardia  -     Referral to Neurology; Future  Patient with ongoing symptoms and concern for autonomic disorder.  She requests referral to neurology  Encouraged to consider [facility] wellness/whole health for further evaluation  Continue care with specialist  Continue to work on [employer]/healthy diet and regular activity/exercise  Work on sleep patterns  Follow-up as scheduled

## 2023-11-15 NOTE — PATIENT INSTRUCTIONS
Thank you for choosing our office for your health care needs    Referral to neurology as provided    Fluids, healthy diet  Monitor symptoms

## 2024-01-08 ENCOUNTER — TRANSCRIBE ORDERS (OUTPATIENT)
Dept: RHEUMATOLOGY | Facility: CLINIC | Age: 29
End: 2024-01-08
Payer: COMMERCIAL

## 2024-01-08 DIAGNOSIS — R76.8 POSITIVE ANA (ANTINUCLEAR ANTIBODY): ICD-10-CM

## 2024-01-08 DIAGNOSIS — M35.7 BENIGN HYPERMOBILITY SYNDROME: ICD-10-CM

## 2024-01-08 DIAGNOSIS — R70.0 ELEVATED SED RATE: ICD-10-CM

## 2024-01-08 DIAGNOSIS — R53.83 OTHER FATIGUE: ICD-10-CM

## 2024-01-08 DIAGNOSIS — Z91.09 SENSITIVITY TO SUNLIGHT: ICD-10-CM

## 2024-01-08 DIAGNOSIS — E55.9 VITAMIN D DEFICIENCY: ICD-10-CM

## 2024-01-08 DIAGNOSIS — Z88.9 MULTIPLE ALLERGIES: ICD-10-CM

## 2024-01-08 DIAGNOSIS — M35.9 UNDIFFERENTIATED CONNECTIVE TISSUE DISEASE (MULTI): ICD-10-CM

## 2024-01-08 DIAGNOSIS — M06.09 RHEUMATOID ARTHRITIS OF MULTIPLE SITES WITHOUT RHEUMATOID FACTOR (MULTI): ICD-10-CM

## 2024-01-11 ENCOUNTER — OFFICE VISIT (OUTPATIENT)
Dept: PRIMARY CARE | Facility: CLINIC | Age: 29
End: 2024-01-11
Payer: COMMERCIAL

## 2024-01-11 VITALS
WEIGHT: 244 LBS | BODY MASS INDEX: 41.88 KG/M2 | OXYGEN SATURATION: 100 % | DIASTOLIC BLOOD PRESSURE: 80 MMHG | SYSTOLIC BLOOD PRESSURE: 124 MMHG | HEART RATE: 100 BPM

## 2024-01-11 DIAGNOSIS — M06.9 RHEUMATOID ARTHRITIS OF OTHER SITE, UNSPECIFIED WHETHER RHEUMATOID FACTOR PRESENT (MULTI): ICD-10-CM

## 2024-01-11 DIAGNOSIS — E66.01 CLASS 3 SEVERE OBESITY DUE TO EXCESS CALORIES WITHOUT SERIOUS COMORBIDITY WITH BODY MASS INDEX (BMI) OF 40.0 TO 44.9 IN ADULT (MULTI): Primary | ICD-10-CM

## 2024-01-11 PROCEDURE — 1036F TOBACCO NON-USER: CPT | Performed by: NURSE PRACTITIONER

## 2024-01-11 PROCEDURE — 99213 OFFICE O/P EST LOW 20 MIN: CPT | Performed by: NURSE PRACTITIONER

## 2024-01-11 PROCEDURE — 3008F BODY MASS INDEX DOCD: CPT | Performed by: NURSE PRACTITIONER

## 2024-01-11 ASSESSMENT — PROMIS GLOBAL HEALTH SCALE
RATE_QUALITY_OF_LIFE: GOOD
RATE_AVERAGE_PAIN: 2
CARRYOUT_SOCIAL_ACTIVITIES: GOOD
CARRYOUT_PHYSICAL_ACTIVITIES: MOSTLY
RATE_AVERAGE_FATIGUE: SEVERE
RATE_GENERAL_HEALTH: FAIR
RATE_MENTAL_HEALTH: FAIR
RATE_PHYSICAL_HEALTH: FAIR
RATE_SOCIAL_SATISFACTION: GOOD
EMOTIONAL_PROBLEMS: SOMETIMES

## 2024-01-11 NOTE — PROGRESS NOTES
Subjective   Patient ID: Chanel Mata is a 28 y.o. female who presents for Consult (Weight management. ).    HPI   Chanel is a 28-year-old female who presents with weight concerns  History of obesity.  She has been trying multiple diet programs and exercise routines with little success  Previously on metformin In hopes of losing weight without success  Patient is interested in GLP-1 initiation      Review of Systems  Constitutional Symptoms: Negative for fever, loss of appetite, headaches, fatigue.   Cardiovascular: Negative for chest pain/pressure, palpitations, edema  Respiratory: Negative for shortness of breath, dyspnea on exertion, pain with breathing, coughing.   Gastrointestinal: Negative for nausea, vomiting, abdominal pain, change in bowel habits  Musculoskeletal: Negative for joint pain, joint swelling, myalgias, cramps.   Integumentary: Negative for skin trouble or rash.   Neurological: Negative for headache, numbness, tingling, weakness, tremors.   Psychiatric: Negative for depression, anxiety.   Endocrine: Negative for weight gain, heat or cold intolerance, polyuria, polydipsia, polyphagia.   Hematologic/Lymphatic: Negative for bruising, abnormal bleeding, swollen glands.    Objective   /80   Pulse 100   Wt 111 kg (244 lb)   SpO2 100%   BMI 41.88 kg/m²     Physical Exam  alert and oriented x3, NAD  Neck supple with no JVD  Lungs CTA bilaterally  Heart with RRR with no edema.  Abd obese  skin warm and dry  Neuro grossly intact    Assessment/Plan   Diagnoses and all orders for this visit:  Class 3 severe obesity due to excess calories without serious comorbidity with body mass index (BMI) of 40.0 to 44.9 in adult (CMS/[facility])  -     tirzepatide, weight loss, (Zepbound) 2.5 mg/0.5 mL injection; Inject 2.5 mg under the skin every 7 days.  Patient interested in GLP-1 initiation  Zepbound prescription given  Patient encouraged to look for co-pay cards  Check with insurance to see about  coverage  Coverage limitations and supply issues discussed with patient  Encouraged healthy diet, choosing more protein and limiting carbs and portion sizes  Encourage fluids  Regular exercise  Follow-up 1 month after initiation of medication for reevaluation

## 2024-01-14 ENCOUNTER — DOCUMENTATION (OUTPATIENT)
Dept: PRIMARY CARE | Facility: CLINIC | Age: 29
End: 2024-01-14
Payer: COMMERCIAL

## 2024-01-15 ENCOUNTER — OFFICE VISIT (OUTPATIENT)
Dept: NEUROLOGY | Facility: HOSPITAL | Age: 29
End: 2024-01-15
Payer: COMMERCIAL

## 2024-01-15 VITALS
WEIGHT: 240 LBS | HEIGHT: 64 IN | BODY MASS INDEX: 40.97 KG/M2 | DIASTOLIC BLOOD PRESSURE: 90 MMHG | RESPIRATION RATE: 18 BRPM | HEART RATE: 96 BPM | SYSTOLIC BLOOD PRESSURE: 121 MMHG

## 2024-01-15 DIAGNOSIS — R61 EXCESSIVE SWEATING: ICD-10-CM

## 2024-01-15 DIAGNOSIS — R00.0 TACHYCARDIA: ICD-10-CM

## 2024-01-15 PROCEDURE — 1036F TOBACCO NON-USER: CPT | Performed by: STUDENT IN AN ORGANIZED HEALTH CARE EDUCATION/TRAINING PROGRAM

## 2024-01-15 PROCEDURE — 99214 OFFICE O/P EST MOD 30 MIN: CPT | Performed by: STUDENT IN AN ORGANIZED HEALTH CARE EDUCATION/TRAINING PROGRAM

## 2024-01-15 PROCEDURE — 3008F BODY MASS INDEX DOCD: CPT | Performed by: STUDENT IN AN ORGANIZED HEALTH CARE EDUCATION/TRAINING PROGRAM

## 2024-01-15 PROCEDURE — 99204 OFFICE O/P NEW MOD 45 MIN: CPT | Performed by: STUDENT IN AN ORGANIZED HEALTH CARE EDUCATION/TRAINING PROGRAM

## 2024-01-15 NOTE — PATIENT INSTRUCTIONS
-Will send referral for Autonomic testing , Neuro-diagnostic number is [phone]   -Will send referral for the follow up in the Autonomic clinic with Dr Argueta   -Please call Neurology clinic for any concern or questions

## 2024-01-15 NOTE — PROGRESS NOTES
NEUROLOGY EPILEPSY CLINIC NEW PATIENT HISTORY AND PHYSICAL EXAMINATION NOTE       Name: Chanel Mata  MRN: [id number]  : 1995    Clinic: Epilepsy   Primary Care Provider:  CORBY Dow    History of Present Illness:   28 Y F with PMH of lupus , PCOS, obesity , anxiety , depression presented to clinic for the concern of tachycardia, confusion and excessive sweating. Patient reported last year she started having bladder retentions and stayed on catheter for about a month. Patient reported her tachycardia and sweating significantly got worse after that , patient reports she didn't have any palpitations but HR consistently stay above 100 and around 80 during sleep. Patient reports she is having 2-3 sweating episodes , mostly hot shower or heat triggers. Patient denied any anxiety or fear related with these episodes. Associated with dizziness explained as lightheaded feeling that occurs with these episodes , lightheaded feeling is usually when standing or standing from sitting position. Patient also reports of brief passing out spells 1-2 in a month when trying to get up associated with flushing sensation and lightheaded feeling. She reports of having extensive cardiac workup and it was unremarkable. Patient reports her anxiety is well controlled with propanolol.Patient reports of having no urge to go to bathroom and sometimes having accident.     Patient denied any numbness/tingling in the hands and feet , any focal weakness , double vision , or blurry vision or tongue bite.     Patient is taking plaquenil for lupus and propanolol for anxiety.           MRI brain 2022 :   No acute finding         - Remote Risk Factors:  - Head Injury: No  - Stroke: No  - Meningitis/Encephalitis/Other CNS infections: No  -  Problems/Injuries: None        All Active Medications:   Current Outpatient Medications   Medication Sig Dispense Refill    Apri 0.15-0.03 mg tablet TAKE 1 TABLET BY MOUTH EVERY  DAY 84 tablet 3    hydrOXYzine HCL (Atarax) 25 mg tablet Take 1 tablet (25 mg) by mouth if needed.      omeprazole (PriLOSEC) 40 mg DR capsule once every 24 hours.      PlaqueniL 200 mg tablet 200 mg Orally twice daily for 90 days      prazosin (Minipress) 1 mg capsule once every 24 hours.      predniSONE (Deltasone) 10 mg tablet Take 1 tablet (10 mg) by mouth once daily.      propranolol (Inderal) 10 mg tablet Take 1 tablet (10 mg) by mouth 2 times a day as needed.      Vyvanse 60 mg capsule once every 24 hours.      dextromethorphan HBr/bupropion (AUVELITY ORAL) Take by mouth.      tirzepatide, weight loss, (Zepbound) 2.5 mg/0.5 mL injection Inject 2.5 mg under the skin every 7 days. 4 each 0     No current facility-administered medications for this visit.       Allergies:   Allergies   Allergen Reactions    Ciprofloxacin Other     tendinitis    Doxycycline Rash    Penicillins Rash    Sulfa (Sulfonamide Antibiotics) Rash           Past Medical/Surgical History:   Past Medical History:   Diagnosis Date    Chronic post-traumatic stress disorder     Personal history of other mental and behavioral disorders 08/23/2017    History of anxiety disorder    Personal history of other specified conditions 08/23/2017    History of headache       Social History:   Social History     Socioeconomic History    Marital status: Single     Spouse name: None    Number of children: None    Years of education: None    Highest education level: None   Occupational History    None   Tobacco Use    Smoking status: Never    Smokeless tobacco: Never   Substance and Sexual Activity    Alcohol use: Never    Drug use: Never    Sexual activity: Not Currently   Other Topics Concern    None   Social History Narrative    None     Social Determinants of Health     Financial Resource Strain: Not on file   Food Insecurity: Not on file   Transportation Needs: Not on file   Physical Activity: Not on file   Stress: Not on file   Social Connections: Not on  "file   Intimate Partner Violence: Not on file   Housing Stability: Not on file       Family History:   No family history on file.    Review of Systems:   Patient did not have additional neurological, psychiatric, respiratory, cardiovascular, gastrointestinal, genitourinary, dermatologic, hematologic, endocrinologic or musculoskeletal symptoms. A complete 14 system review was negative except as stated in HPI.     PHYSICAL EXAMINATION:     Vital Signs:   /90   Pulse 96   Resp 18   Ht 1.626 m (5' 4\")   Wt 109 kg (240 lb)   BMI 41.20 kg/m²     General Examination:   Constitutional: Pt is very pleasant, well nourished, well developed. Eyes: See under neurological examination. Musculoskeletal and extremities: See under neurological examination. Psychiatric: Patient is cooperative and friendly, keeps good eye contact, thought content and form are normal.    Neurological Examination:   Mental Status: Pt is awake, alert and oriented to time, place and person. Patient has normal speech, language, good fund of knowledge and intact recent and remote memory.     Cranial Nerves: (2nd) Patients visual fields are full to finger confrontation. (3rd, 4th, 6th) Patients pupils are equal round and reactive to light bilaterally. External ocular muscles are intact with conjugate eye movements. No nystagmus detected. (5th) Facial sensation to both light touch and pin prick are intact. (7th) Face is symmetric and facial movements are strong. (8th) hearing is intact to finger rub bilaterally. (11th) Shoulder shrug is intact bilaterally.     Motor examination: Muscle tone is normal globally. No focal atrophy is present. No fasciculation is detected. There is no pronator drift or orbiting.   Strength of upper extremities;Patient has 5/5 strength in both UEs and LEs proximally and distally    Deep tendon reflexes: Patient has normal symmetric reflexes in all 4 extremities    Sensory examination: Patient has normal light touch " sensation over all extremities.     Coordination examination: Rapid alternating movements are performed at good rate and rhythm. Finger to nose test with eyes open and closed performed normally and bilaterally.     Gait examination: Patient has normal gait with normal arm swing. Tandem gait is normal.     Lab Results   Component Value Date    WBC 8.6 11/12/2023    HGB 14.9 11/12/2023    HCT 43.4 11/12/2023    MCV 87 11/12/2023     11/12/2023       Lab Results   Component Value Date     11/12/2023    K 4.3 11/12/2023     11/12/2023    CO2 22 (L) 11/12/2023       Lab Results   Component Value Date    CREATININE 0.80 11/12/2023    BUN 13 11/12/2023     11/12/2023    K 4.3 11/12/2023     11/12/2023    CO2 22 (L) 11/12/2023       Lab Results   Component Value Date    ALT 20 11/12/2023    AST 22 11/12/2023    ALKPHOS 83 11/12/2023    BILITOT <0.2 11/12/2023           IMPRESSION:   This is a 28 Y F with PMH of lupus , PCOS, obesity , anxiety , depression presented to clinic for the concern tachycardia and hyperhidrosis. Neurological exam is non-focal , based on the history patient is likely having pre-syncopal episodes associated with prodromal symptoms. Given her tachycardia and hyperhidrosis could be some medication side effect buts its reasonable to obtain autonomic testing as patient is having autoimmune disease (Lupus). Detailed discussion with the patient that after this testing patient will be referred to Autonomic Neurology clinic.        PLAN:  -Will obtain autonomic testing and Neuro-diagnostic number provided to the patient [phone]  -Encourage hydration for the concern of pre-syncopal episodes and fall precautions   -Will send referral to Neurology clinic with Dr Argueta   -Patient asked to call clinic for any concern or questions           Rolando Carlisle MD   Epilepsy Fellow   [facility]

## 2024-01-18 NOTE — PROGRESS NOTES
Called patient #3.  Left detailed VM informing pt to call office with any other questions re: Zepbound excluded from insurance.

## 2024-01-24 ENCOUNTER — APPOINTMENT (OUTPATIENT)
Dept: RHEUMATOLOGY | Facility: CLINIC | Age: 29
End: 2024-01-24
Payer: COMMERCIAL

## 2024-01-29 ENCOUNTER — APPOINTMENT (OUTPATIENT)
Dept: RHEUMATOLOGY | Facility: CLINIC | Age: 29
End: 2024-01-29
Payer: COMMERCIAL

## 2024-03-06 ENCOUNTER — LAB (OUTPATIENT)
Dept: LAB | Facility: LAB | Age: 29
End: 2024-03-06
Payer: COMMERCIAL

## 2024-03-06 DIAGNOSIS — R70.0 ELEVATED SED RATE: ICD-10-CM

## 2024-03-06 DIAGNOSIS — M06.09 RHEUMATOID ARTHRITIS OF MULTIPLE SITES WITHOUT RHEUMATOID FACTOR (MULTI): ICD-10-CM

## 2024-03-06 DIAGNOSIS — M35.7 BENIGN HYPERMOBILITY SYNDROME: ICD-10-CM

## 2024-03-06 DIAGNOSIS — Z88.9 MULTIPLE ALLERGIES: ICD-10-CM

## 2024-03-06 DIAGNOSIS — M35.9 UNDIFFERENTIATED CONNECTIVE TISSUE DISEASE (MULTI): ICD-10-CM

## 2024-03-06 DIAGNOSIS — R53.83 OTHER FATIGUE: ICD-10-CM

## 2024-03-06 DIAGNOSIS — Z91.09 SENSITIVITY TO SUNLIGHT: ICD-10-CM

## 2024-03-06 DIAGNOSIS — E55.9 VITAMIN D DEFICIENCY: ICD-10-CM

## 2024-03-06 DIAGNOSIS — R76.8 POSITIVE ANA (ANTINUCLEAR ANTIBODY): ICD-10-CM

## 2024-03-06 LAB
25(OH)D3 SERPL-MCNC: 37 NG/ML (ref 31–100)
APPEARANCE UR: CLEAR
BACTERIA #/AREA URNS AUTO: ABNORMAL /HPF
BILIRUB UR STRIP.AUTO-MCNC: NEGATIVE MG/DL
CK SERPL-CCNC: 62 U/L (ref 24–195)
COLOR UR: YELLOW
CRP SERPL-MCNC: 0.4 MG/DL (ref 0–2)
ERYTHROCYTE [SEDIMENTATION RATE] IN BLOOD BY WESTERGREN METHOD: 3 MM/H (ref 0–20)
GLUCOSE UR STRIP.AUTO-MCNC: NORMAL MG/DL
KETONES UR STRIP.AUTO-MCNC: NEGATIVE MG/DL
LEUKOCYTE ESTERASE UR QL STRIP.AUTO: NEGATIVE
MUCOUS THREADS #/AREA URNS AUTO: ABNORMAL /LPF
NITRITE UR QL STRIP.AUTO: NEGATIVE
PH UR STRIP.AUTO: 7 [PH]
PROT UR STRIP.AUTO-MCNC: ABNORMAL MG/DL
RBC # UR STRIP.AUTO: NEGATIVE /UL
RBC #/AREA URNS AUTO: ABNORMAL /HPF
SP GR UR STRIP.AUTO: 1.03
SQUAMOUS #/AREA URNS AUTO: ABNORMAL /HPF
UROBILINOGEN UR STRIP.AUTO-MCNC: NORMAL MG/DL
WBC #/AREA URNS AUTO: ABNORMAL /HPF

## 2024-03-06 PROCEDURE — 85652 RBC SED RATE AUTOMATED: CPT

## 2024-03-06 PROCEDURE — 86160 COMPLEMENT ANTIGEN: CPT

## 2024-03-06 PROCEDURE — 82550 ASSAY OF CK (CPK): CPT

## 2024-03-06 PROCEDURE — 82652 VIT D 1 25-DIHYDROXY: CPT

## 2024-03-06 PROCEDURE — 82306 VITAMIN D 25 HYDROXY: CPT

## 2024-03-06 PROCEDURE — 86140 C-REACTIVE PROTEIN: CPT

## 2024-03-06 PROCEDURE — 83529 ASAY OF INTERLEUKIN-6 (IL-6): CPT

## 2024-03-06 PROCEDURE — 86225 DNA ANTIBODY NATIVE: CPT

## 2024-03-06 PROCEDURE — 36415 COLL VENOUS BLD VENIPUNCTURE: CPT

## 2024-03-06 PROCEDURE — 81001 URINALYSIS AUTO W/SCOPE: CPT

## 2024-03-07 LAB
C3 SERPL-MCNC: 197 MG/DL (ref 87–200)
C4 SERPL-MCNC: 49 MG/DL (ref 10–50)
DSDNA AB SER-ACNC: <1 IU/ML
HOLD SPECIMEN: NORMAL

## 2024-03-08 LAB
1,25(OH)2D3 SERPL-MCNC: 59.5 PG/ML (ref 19.9–79.3)
IL6 SERPL-MCNC: <2 PG/ML

## 2024-03-12 ENCOUNTER — APPOINTMENT (OUTPATIENT)
Dept: NEUROLOGY | Facility: HOSPITAL | Age: 29
End: 2024-03-12
Payer: COMMERCIAL

## 2024-03-14 LAB — SCAN RESULT: NORMAL

## 2024-03-15 PROBLEM — D24.9 BENIGN NEOPLASM OF UNSPECIFIED BREAST: Status: ACTIVE | Noted: 2024-03-15

## 2024-03-15 PROBLEM — M35.7 HYPERMOBILITY SYNDROME: Status: ACTIVE | Noted: 2024-03-15

## 2024-03-15 PROBLEM — R61 EXCESSIVE SWEATING: Status: ACTIVE | Noted: 2023-04-29

## 2024-03-15 PROBLEM — F43.21 ADJUSTMENT DISORDER WITH DEPRESSED MOOD: Status: ACTIVE | Noted: 2022-10-21

## 2024-03-15 PROBLEM — E66.9 OBESITY: Status: ACTIVE | Noted: 2024-03-15

## 2024-03-15 PROBLEM — M32.9 LUPUS (SYSTEMIC LUPUS ERYTHEMATOSUS) (MULTI): Status: ACTIVE | Noted: 2024-03-15

## 2024-03-15 PROBLEM — Z72.51 HIGH RISK HETEROSEXUAL BEHAVIOR: Status: ACTIVE | Noted: 2023-06-05

## 2024-03-15 PROBLEM — R70.0 ELEVATED ERYTHROCYTE SEDIMENTATION RATE: Status: ACTIVE | Noted: 2023-01-27

## 2024-03-15 PROBLEM — Z80.3 FAMILY HISTORY OF MALIGNANT NEOPLASM OF BREAST: Status: ACTIVE | Noted: 2023-05-08

## 2024-03-15 RX ORDER — DULOXETIN HYDROCHLORIDE 60 MG/1
CAPSULE, DELAYED RELEASE ORAL
COMMUNITY
Start: 2024-01-11 | End: 2024-03-20 | Stop reason: ALTCHOICE

## 2024-03-15 RX ORDER — DEXTROMETHORPHAN HYDROBROMIDE, BUPROPION HYDROCHLORIDE 105; 45 MG/1; MG/1
TABLET, MULTILAYER, EXTENDED RELEASE ORAL
COMMUNITY
Start: 2024-02-28

## 2024-03-20 ENCOUNTER — OFFICE VISIT (OUTPATIENT)
Dept: RHEUMATOLOGY | Facility: CLINIC | Age: 29
End: 2024-03-20
Payer: COMMERCIAL

## 2024-03-20 VITALS
HEIGHT: 64 IN | WEIGHT: 239.42 LBS | BODY MASS INDEX: 40.87 KG/M2 | SYSTOLIC BLOOD PRESSURE: 130 MMHG | HEART RATE: 103 BPM | OXYGEN SATURATION: 99 % | DIASTOLIC BLOOD PRESSURE: 88 MMHG

## 2024-03-20 DIAGNOSIS — G43.E09 CHRONIC MIGRAINE WITH AURA WITHOUT STATUS MIGRAINOSUS, NOT INTRACTABLE: ICD-10-CM

## 2024-03-20 DIAGNOSIS — R73.9 HYPERGLYCEMIA: ICD-10-CM

## 2024-03-20 DIAGNOSIS — E55.9 VITAMIN D DEFICIENCY: ICD-10-CM

## 2024-03-20 DIAGNOSIS — Z82.0 FAMILY HISTORY OF MS (MULTIPLE SCLEROSIS): ICD-10-CM

## 2024-03-20 DIAGNOSIS — M06.09 POLYARTHRITIS WITH NEGATIVE RHEUMATOID FACTOR (MULTI): Primary | ICD-10-CM

## 2024-03-20 PROCEDURE — 99214 OFFICE O/P EST MOD 30 MIN: CPT | Performed by: INTERNAL MEDICINE

## 2024-03-20 PROCEDURE — 3008F BODY MASS INDEX DOCD: CPT | Performed by: INTERNAL MEDICINE

## 2024-03-20 RX ORDER — CHOLECALCIFEROL (VITAMIN D3) 50 MCG
50 TABLET ORAL DAILY
COMMUNITY

## 2024-03-20 RX ORDER — HYDROXYCHLOROQUINE SULFATE 200 MG/1
200 TABLET, FILM COATED ORAL 2 TIMES DAILY
Qty: 180 TABLET | Refills: 3 | Status: SHIPPED | OUTPATIENT
Start: 2024-03-20 | End: 2024-03-20 | Stop reason: WASHOUT

## 2024-03-20 RX ORDER — HYDROXYCHLOROQUINE SULFATE 200 MG/1
200 TABLET, FILM COATED ORAL 2 TIMES DAILY
Qty: 60 TABLET | Refills: 11 | Status: SHIPPED | OUTPATIENT
Start: 2024-03-20

## 2024-03-20 ASSESSMENT — PAIN SCALES - GENERAL: PAINLEVEL_OUTOF10: 0 - NO PAIN

## 2024-03-20 ASSESSMENT — PAIN - FUNCTIONAL ASSESSMENT: PAIN_FUNCTIONAL_ASSESSMENT: 0-10

## 2024-03-20 ASSESSMENT — ROUTINE ASSESSMENT OF PATIENT INDEX DATA (RAPID3)
ON A SCALE OF ONE TO TEN, HOW MUCH PAIN HAVE YOU HAD BECAUSE OF YOUR CONDITION OVER THE PAST WEEK?: 0
SUM OF QUESTIONS A TO J: 6
LIFT_CUP_TO_MOUTH: WITHOUT ANY DIFFICULTY
IN_OUT_BED: WITHOUT ANY DIFFICULTY
DRESS_YOURSELF: WITHOUT ANY DIFFICULTY
FN_SCORE: 2
GOOD_NIGHTS_SLEEP: WITH SOME DIFFICULTY
IN_OUT_TRANSPORT: WITHOUT ANY DIFFICULTY
SEVERITY_SCORE: 0
PICK_CLOTHES_OFF_FLOOR: WITH SOME DIFFICULTY
TURN_FAUCETS_OFF: WITHOUT ANY DIFFICULTY
WALK_FLAT_GROUND: WITHOUT ANY DIFFICULTY
WASH_DRY_BODY: WITH SOME DIFFICULTY
WALK_KILOMETERS: WITH MUCH DIFFICULTY
TOTAL RAPID3 SCORE: 5
ON A SCALE OF ONE TO TEN, CONSIDERING ALL THE WAYS IN WHICH ILLNESS AND HEALTH CONDITIONS MAY AFFECT YOU AT THIS TIME, PLEASE INDICATE BELOW HOW YOU ARE DOING:: 3
FEELINGS_ANXIETY_NERVOUS: WITH SOME DIFFICULTY
FEELINGS_DEPRESSION: WITH SOME DIFFICULTY
WEIGHTED_TOTAL_SCORE: 1.67
ON A SCALE OF ONE TO TEN, HOW MUCH PAIN HAVE YOU HAD BECAUSE OF YOUR CONDITION OVER THE PAST WEEK?: 0
PARTIPATE_RECREATIONAL_ACTIVITIES: WITH MUCH DIFFICULTY
ON A SCALE OF ONE TO TEN, CONSIDERING ALL THE WAYS IN WHICH ILLNESS AND HEALTH CONDITIONS MAY AFFECT YOU AT THIS TIME, PLEASE INDICATE BELOW HOW YOU ARE DOING:: 3

## 2024-03-20 ASSESSMENT — ENCOUNTER SYMPTOMS
LOSS OF SENSATION IN FEET: 0
DEPRESSION: 0
OCCASIONAL FEELINGS OF UNSTEADINESS: 0

## 2024-03-20 ASSESSMENT — PATIENT HEALTH QUESTIONNAIRE - PHQ9
2. FEELING DOWN, DEPRESSED OR HOPELESS: NOT AT ALL
SUM OF ALL RESPONSES TO PHQ9 QUESTIONS 1 AND 2: 0
1. LITTLE INTEREST OR PLEASURE IN DOING THINGS: NOT AT ALL

## 2024-03-20 NOTE — PROGRESS NOTES
[facility]/  Rheumatology  [address], [address]  [place] [address]  Phone: [phone]  Fax: [phone]    Rheumatology Progress Note 3/20/24    Chanel Mata is a 28 y.o. female here for   Chief Complaint   Patient presents with    Follow up with labs       Last Visit:     Rheum Hx      Previous Tx    Health Maintenance  DXA  TB testing  Hep B  HepC  HIV  HSV  Lyme  COVID  Malignancy Hx  Immunization History   Administered Date(s) Administered    HPV, Quadrivalent 11/02/2010, 03/03/2011    Influenza, injectable, quadrivalent 10/11/2018, 09/01/2021    Influenza, seasonal, injectable 01/11/2017    Moderna COVID-19 vaccine, Fall 2023, 12 yeasrs and older (50mcg/0.5mL) 10/17/2023    Moderna COVID-19 vaccine, bivalent, blue cap/gray label *Check age/dose* 09/07/2022    Moderna SARS-CoV-2 Vaccination 02/04/2021, 03/04/2021, 12/01/2021    Tdap vaccine, age 7 year and older (BOOSTRIX, ADACEL) 06/28/2011          Past Medical History:   Diagnosis Date    Chronic post-traumatic stress disorder     Depression     Lupus (CMS/HCC)     Nontoxic diffuse goiter     PCOS (polycystic ovarian syndrome)     Personal history of other mental and behavioral disorders 08/23/2017    History of anxiety disorder    Personal history of other specified conditions 08/23/2017    History of headache    PTSD (post-traumatic stress disorder)       Past Surgical History:   Procedure Laterality Date    BREAST LUMPECTOMY  06/21/2018    Breast Surgery Lumpectomy, x3    INNER EAR SURGERY  08/23/2017    Inner Ear Surgery    TONSILLECTOMY  08/23/2017    Tonsillectomy      Current Outpatient Medications   Medication Sig Dispense Refill    Apri 0.15-0.03 mg tablet TAKE 1 TABLET BY MOUTH EVERY DAY 84 tablet 3    Auvelity  mg tablet, IR and ER, biphasic       cholecalciferol (Vitamin D-3) 50 MCG (2000 UT) tablet Take 1 tablet (50 mcg) by mouth once daily.      hydrOXYzine HCL (Atarax) 25 mg tablet Take 1  "tablet (25 mg) by mouth if needed. PRN      prazosin (Minipress) 1 mg capsule once every 24 hours.      predniSONE (Deltasone) 10 mg tablet Take 1 tablet (10 mg) by mouth once daily.      propranolol (Inderal) 10 mg tablet Take 1 tablet (10 mg) by mouth 2 times a day as needed.      Vyvanse 60 mg capsule once every 24 hours.      hydroxychloroquine (Plaquenil) 200 mg tablet Take 1 tablet (200 mg) by mouth 2 times a day. 60 tablet 11    omeprazole (PriLOSEC) 40 mg DR capsule TAKE 1 CAPSULE BY MOUTH EVERY DAY 30 MINUTES BEFORE MORNING MEAL FOR 30 DAYS 90 capsule 3     No current facility-administered medications for this visit.      Allergies   Allergen Reactions    Ciprofloxacin Other     tendinitis    Minocycline Hives    Doxycycline Rash    Penicillins Rash    Sulfa (Sulfonamide Antibiotics) Rash        Visit Vitals  /88 (BP Location: Right arm, Patient Position: Sitting)   Pulse 103   Ht 1.626 m (5' 4\")   Wt 109 kg (239 lb 6.7 oz)   SpO2 99%   BMI 41.10 kg/m²   Smoking Status Never   BSA 2.22 m²      Pain Assessment Pain Score: 0 - No pain     Rapid 3  Function Score (FN): 2  Pain Score (PN) (0-10): 0  Patient Global (PTGL) (0-10): 3  Rapid3 Score: 5  RAPID3 Weighted Score: 1.67   Physical Exam    Workup  Component      Latest Ref Rng 3/6/2024   Color, Urine      Light-Yellow, Yellow, Dark-Yellow  Yellow    Appearance, Urine      Clear  Clear    Specific Gravity, Urine      1.005 - 1.035  1.026    pH, Urine      5.0, 5.5, 6.0, 6.5, 7.0, 7.5, 8.0  7.0    Protein, Urine      NEGATIVE, 10 (TRACE), 20 (TRACE) mg/dL 30 (1+) !    Glucose, Urine      Normal mg/dL Normal    Blood, Urine      NEGATIVE  NEGATIVE    Ketones, Urine      NEGATIVE mg/dL NEGATIVE    Bilirubin, Urine      NEGATIVE  NEGATIVE    Urobilinogen, Urine      Normal mg/dL Normal    Nitrite, Urine      NEGATIVE  NEGATIVE    Leukocyte Esterase, Urine      NEGATIVE  NEGATIVE    WBC, Urine      1-5, NONE /HPF 1-5    RBC, Urine      NONE, 1-2, 3-5 /HPF " 1-2    Squamous Epithelial Cells, Urine      Reference range not established. /HPF 1-9 (SPARSE)    Bacteria, Urine      NONE SEEN /HPF 1+ !    Mucus, Urine      Reference range not established. /LPF FEW    C4 Complement      10 - 50 mg/dL 49    C3 Complement      87 - 200 mg/dL 197    Creatine Kinase      24 - 195 U/L 62    C-Reactive Protein      0.00 - 2.00 mg/dL 0.40    Anti-DNA (DS)      <5.0 IU/mL <1.0    Sed Rate      0 - 20 mm/h 3    Interleukin 6      <=2.0 pg/mL <2.0    Scan Result Vectra 11 (low)   Vit D, 1,25-Dihydroxy      19.9 - 79.3 pg/mL 59.5    Vitamin D, 25-Hydroxy, Total      31 - 100 ng/mL 37    Extra Tube Hold for add-ons.         Assessment/Plan  1. Polyarthritis with negative rheumatoid factor (CMS/HCC)    2. Chronic migraine with aura without status migrainosus, not intractable    3. Family history of MS (multiple sclerosis)    4. Hyperglycemia    5. Vitamin D deficiency       Orders Placed This Encounter   Procedures    Hemoglobin A1C    CBC and Auto Differential    Comprehensive Metabolic Panel    C-Reactive Protein    Creatine Kinase    Sedimentation Rate    Urinalysis with Reflex Culture and Microscopic    Vectra; [facility]; [id number] - Miscellaneous Test    Vitamin D 25-Hydroxy,Total (for eval of Vitamin D levels)    Referral to Neurology              Since last appt, adherent and tolerating  mg daily.   Mother dx with MS balance memory and stumbing, chronic migraines  Denies any recent or current infection.  Not on any NSAIDs or glucocorticoids.  Labs reviewed  D/w pt tx options Advised of possible side effects and importance of monitoring.   All questions answered.  Patient to follow up with primary care provider regarding all other medical issues not addressed today and for medical chart updating.    Mar Fernandez MD      Patient Care Team:  CORBY Dow as PCP - General (Family Medicine)  CORBY Dow as Primary Care Provider

## 2024-03-28 DIAGNOSIS — K21.9 GASTROESOPHAGEAL REFLUX DISEASE, UNSPECIFIED WHETHER ESOPHAGITIS PRESENT: Primary | ICD-10-CM

## 2024-03-28 RX ORDER — OMEPRAZOLE 40 MG/1
CAPSULE, DELAYED RELEASE ORAL
Qty: 90 CAPSULE | Refills: 3 | Status: SHIPPED | OUTPATIENT
Start: 2024-03-28

## 2024-05-13 NOTE — PROGRESS NOTES
Chanel Mata female   1995 28 y.o.   [id number]             HPI  Chanel Mata is a 28 y.o.  [facility] for breast fibroadenomas. 2023 she was seen in the Emergency Department visit for lupus flare up and CT was performed noting incidental breast masses which prompted breast imaging. 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 She has intermittent breast dull aching pain. She notes right nipple skin cyst/pore with occasional white discharge. She has family history of breast cancer in paternal aunt in her late 40s, recurrence in her 60s.    BREAST IMAGIN2023 bilateral diagnostic mammogram at [facility], BI-RADS Category 0, bilateral breast masses.  2023 bilateral breast ultrasound at [facility], BI-RADS Category 3, right breast 9:00 mid depth 1.4 x 1.4 x 0.8cm probable benign fibroadenoma, left breast 9:00 mid depth 1.9 x 1.2 x 0.5cm probable benign fibroadenoma, follow up bilateral ultrasound is recommended.  2023 right breast ultrasound, BI-RADS Category 3, stable probable benign breast masses.    REPRODUCTIVE HISTORY: menarche age 6m nullipara, premenopausal with PCOS, current OCPs, LMP 2023, 3 right benign excisions for juvenile fibroadenomas    FAMILY CANCER HISTORY  Paternal aunt: breast cancer in her late 40s, recurrence in her 60s.  Father: HSV related throat cance      REVIEW OF SYSTEMS    Constitutional:  Negative for appetite change, fatigue, fever and unexpected weight change.   HENT:  Negative for ear pain, hearing loss, nosebleeds, sore throat and trouble swallowing.    Eyes:  Negative for discharge, itching and visual disturbance.   Respiratory:  Negative for cough, chest tightness and shortness of breath.    Cardiovascular:  Negative for chest pain, palpitations and leg swelling.   Breast: as indicated in HPI  Gastrointestinal:  Negative  for abdominal pain, constipation, diarrhea and nausea.   Endocrine: Negative for cold intolerance and heat intolerance.   Genitourinary:  Negative for dysuria, frequency, hematuria, pelvic pain and vaginal bleeding.   Musculoskeletal:  Negative for arthralgias, back pain, gait problem, joint swelling and myalgias.   Skin:  Negative for color change and rash.   Allergic/Immunologic: Negative for environmental allergies and food allergies.   Neurological:  Negative for dizziness, tremors, speech difficulty, weakness, numbness and headaches.   Hematological:  Does not bruise/bleed easily.   Psychiatric/Behavioral:  Negative for agitation, dysphoric mood and sleep disturbance. The patient is not nervous/anxious.         MEDICATIONS  Current Outpatient Medications   Medication Instructions    Apri 0.15-0.03 mg tablet 1 tablet, oral, Daily    Auvelity  mg tablet, IR and ER, biphasic     cholecalciferol (VITAMIN D-3) 50 mcg, oral, Daily    hydroxychloroquine (PLAQUENIL) 200 mg, oral, 2 times daily    hydrOXYzine HCL (ATARAX) 25 mg, oral, As needed, PRN    omeprazole (PriLOSEC) 40 mg DR capsule TAKE 1 CAPSULE BY MOUTH EVERY DAY 30 MINUTES BEFORE MORNING MEAL FOR 30 DAYS    prazosin (Minipress) 1 mg capsule Every 24 hours    predniSONE (DELTASONE) 10 mg, oral, Daily    propranolol (INDERAL) 10 mg, oral, 2 times daily PRN    Vyvanse 60 mg capsule Every 24 hours        ALLERGIES  Allergies   Allergen Reactions    Ciprofloxacin Other     tendinitis    Minocycline Hives    Doxycycline Rash    Penicillins Rash    Sulfa (Sulfonamide Antibiotics) Rash        SOCIAL HISTORY    Family History   Problem Relation Name Age of Onset    Multiple sclerosis Mother           Social History     Tobacco Use    Smoking status: Never    Smokeless tobacco: Never   Substance Use Topics    Alcohol use: Yes     Comment: Occasionally        VITALS  Vitals:    05/14/24 1332   BP: 133/90   Pulse: 93          PHYSICAL EXAM  Patient is alert and  oriented x3, with appropriate mood. Her gait is steady and hand grasps are equal. Sclera clear. The breasts are large and fairly symmetrical. The right breast with 3 healed incisions, superior central, central lateral near areola and partial circumareolar. I am not able to appreciate any measurable masses however she does have breast tenderness central lateral of bilateral breasts. The left tissue is soft without palpable abnormalities, discrete nodules or masses. The skin and nipples appear normal, no skin cyst noted, no nipple discharge. There is no cervical, supraclavicular, or axillary lymphadenopathy palpable. Heart rate and rhythm normal, S1 and S2 appreciated. The lungs are clear bilaterally. Abdomen is soft & non-tender.    Physical Exam  Chest:              IMAGING  BI US breast limited bilateral 05/14/2024    Narrative  Interpreted By:  Alice Liz,  STUDY:  BI US BREAST LIMITED BILATERAL;  5/14/2024 1:14 pm    INDICATION:  Signs/Symptoms:l & r follow up. Continued short-term ultrasound  follow-up for solitary probably benign masses within each breast.    COMPARISON:  Bilateral mammogram 11/14/2023. Bilateral outside mammogram  07/12/2023.    FINDINGS:  Right breast: Sonographic scanning targeted to the 5 o'clock  position, 6 cm from the nipple confirms a stable oval circumscribed  parallel hypoechoic mass measuring 1.0 x 0.4 x 0.6 cm. No internal  Doppler blood flow and soft elastography features are demonstrated.    Left breast: Sonographic scanning targeted to the 8:30 position, 5 cm  from the nipple confirms a stable oval circumscribed parallel  hypoechoic mass measuring 1.2 x 0.9 x 0.9 cm. Minimal peripheral  Doppler blood flow and soft elastography features are demonstrated.    Impression  Stable probably benign bilateral breast masses, 5 o'clock position  right breast and 8:30 position left breast. 1 final targeted  ultrasound follow-up in 12 months recommended to document 2 full  years of  stability.    BI-RADS Category:  3 Probably Benign.  Recommendation:  Short-term Interval Follow-up Imaging.  Recommended Date:  1 Year.  Laterality:  Bilateral.        Time was spent viewing digital images of the radiology testing with the patient. I explained the results in depth, along with suggested explanation for follow up recommendations based on the testing results.      RISK PROFILE      ORDERS  Orders Placed This Encounter   Procedures    BI US breast limited bilateral     Standing Status:   Future     Standing Expiration Date:   7/14/2025     Order Specific Question:   Reason for exam:     Answer:   bilateral     Order Specific Question:   Radiologist to Determine Optimal Study     Answer:   Yes     Order Specific Question:   Release result to [facility]     Answer:   Immediate [1]     Order Specific Question:   Is this exam part of a Research Study? If Yes, link this order to the research study     Answer:   No           ASSESSMENT/PLAN  1. Fibroadenoma of both breasts  Clinic Appointment Request    BI US breast limited bilateral    Clinic Appointment Request             Follow up in about 1 year (around 5/14/2025), or with bilateral ultrasound.      Delisa Marvin, [facility]

## 2024-05-14 ENCOUNTER — HOSPITAL ENCOUNTER (OUTPATIENT)
Dept: RADIOLOGY | Facility: CLINIC | Age: 29
Discharge: HOME | End: 2024-05-14
Payer: COMMERCIAL

## 2024-05-14 ENCOUNTER — OFFICE VISIT (OUTPATIENT)
Dept: SURGICAL ONCOLOGY | Facility: CLINIC | Age: 29
End: 2024-05-14
Payer: COMMERCIAL

## 2024-05-14 VITALS
HEART RATE: 93 BPM | BODY MASS INDEX: 41.48 KG/M2 | DIASTOLIC BLOOD PRESSURE: 90 MMHG | WEIGHT: 243 LBS | HEIGHT: 64 IN | SYSTOLIC BLOOD PRESSURE: 133 MMHG

## 2024-05-14 VITALS — BODY MASS INDEX: 41.08 KG/M2 | HEIGHT: 64 IN

## 2024-05-14 DIAGNOSIS — D24.2 FIBROADENOMA OF BOTH BREASTS: ICD-10-CM

## 2024-05-14 DIAGNOSIS — D24.1 FIBROADENOMA OF BOTH BREASTS: ICD-10-CM

## 2024-05-14 PROCEDURE — 76642 ULTRASOUND BREAST LIMITED: CPT | Mod: 50

## 2024-05-14 PROCEDURE — 76642 ULTRASOUND BREAST LIMITED: CPT | Mod: BILATERAL PROCEDURE | Performed by: RADIOLOGY

## 2024-05-14 PROCEDURE — 99213 OFFICE O/P EST LOW 20 MIN: CPT | Performed by: NURSE PRACTITIONER

## 2024-05-14 PROCEDURE — 76983 USE EA ADDL TARGET LESION: CPT | Mod: 59 | Performed by: NURSE PRACTITIONER

## 2024-05-14 PROCEDURE — 3008F BODY MASS INDEX DOCD: CPT | Performed by: NURSE PRACTITIONER

## 2024-05-14 PROCEDURE — 76982 USE 1ST TARGET LESION: CPT

## 2024-05-14 PROCEDURE — 1036F TOBACCO NON-USER: CPT | Performed by: NURSE PRACTITIONER

## 2024-05-14 ASSESSMENT — PAIN SCALES - GENERAL: PAINLEVEL: 0-NO PAIN

## 2024-05-21 DIAGNOSIS — E66.09 OBESITY DUE TO EXCESS CALORIES WITHOUT SERIOUS COMORBIDITY, UNSPECIFIED CLASSIFICATION: Primary | ICD-10-CM

## 2024-05-21 RX ORDER — TIRZEPATIDE 2.5 MG/.5ML
2.5 INJECTION, SOLUTION SUBCUTANEOUS
Qty: 2 ML | Refills: 0 | Status: SHIPPED | OUTPATIENT
Start: 2024-05-21 | End: 2024-06-18

## 2024-05-23 ENCOUNTER — APPOINTMENT (OUTPATIENT)
Dept: NEUROLOGY | Facility: HOSPITAL | Age: 29
End: 2024-05-23
Payer: COMMERCIAL

## 2024-07-17 ENCOUNTER — TELEMEDICINE (OUTPATIENT)
Dept: OBSTETRICS AND GYNECOLOGY | Facility: CLINIC | Age: 29
End: 2024-07-17
Payer: COMMERCIAL

## 2024-07-17 DIAGNOSIS — E28.2 PCOS (POLYCYSTIC OVARIAN SYNDROME): Primary | ICD-10-CM

## 2024-07-17 DIAGNOSIS — N92.6 IRREGULAR PERIODS/MENSTRUAL CYCLES: ICD-10-CM

## 2024-07-17 DIAGNOSIS — E65: ICD-10-CM

## 2024-07-17 DIAGNOSIS — R63.5 WEIGHT GAIN: ICD-10-CM

## 2024-07-17 PROCEDURE — 99213 OFFICE O/P EST LOW 20 MIN: CPT | Performed by: OBSTETRICS & GYNECOLOGY

## 2024-07-17 PROCEDURE — 99213 OFFICE O/P EST LOW 20 MIN: CPT | Mod: 95 | Performed by: OBSTETRICS & GYNECOLOGY

## 2024-07-18 NOTE — PROGRESS NOTES
Consent:  Verbal consent was requested and obtained from patient on this date for a telehealth visit to determine if a office visit would be necessary for the patient's complaint(s).      Subjective    HPI: This is a 28-year-old G0 female that presents for televisit to discuss ongoing fatigue and weight gain.  Patient does have a history of PCOS and does note that she stopped her birth control pills for a couple of months last year and did not get a menses.  Patient then started back on them and had been doing well.  Patient states she was diagnosed with PCOS during her teens and has been off and on on birth control over this time.  Patient reports over the last year or so increasing weight gain for which patient presents understand why she continues to gain weight as she is attempting diet changes as well as increasing her level of activity.  Patient reports doing a number of workout classes each week and eating well and get she still gaining weight.  Patient also complaining of ongoing fatigue.  Of note patient does have a history of lupus, currently following with rheumatology, intermittently on prednisone for flares.  Of note as well patient states she does want hormones checked, especially cortisol levels.    Past Medical History:   Diagnosis Date    Chronic post-traumatic stress disorder     Depression     Lupus (Multi)     Nontoxic diffuse goiter     PCOS (polycystic ovarian syndrome)     Personal history of other mental and behavioral disorders 08/23/2017    History of anxiety disorder    Personal history of other specified conditions 08/23/2017    History of headache    PTSD (post-traumatic stress disorder)         Past Surgical History:   Procedure Laterality Date    BREAST LUMPECTOMY  06/21/2018    Breast Surgery Lumpectomy, x3    INNER EAR SURGERY  08/23/2017    Inner Ear Surgery    TONSILLECTOMY  08/23/2017    Tonsillectomy        Social History     Tobacco Use    Smoking status: Never    Smokeless  tobacco: Never   Vaping Use    Vaping status: Never Used   Substance Use Topics    Alcohol use: Yes     Comment: Occasionally    Drug use: Never        Current Outpatient Medications   Medication Instructions    Apri 0.15-0.03 mg tablet 1 tablet, oral, Daily    Auvelity  mg tablet, IR and ER, biphasic     cholecalciferol (VITAMIN D-3) 50 mcg, oral, Daily    hydroxychloroquine (PLAQUENIL) 200 mg, oral, 2 times daily    hydrOXYzine HCL (ATARAX) 25 mg, oral, As needed, PRN    omeprazole (PriLOSEC) 40 mg DR capsule TAKE 1 CAPSULE BY MOUTH EVERY DAY 30 MINUTES BEFORE MORNING MEAL FOR 30 DAYS    prazosin (Minipress) 1 mg capsule Every 24 hours    predniSONE (DELTASONE) 10 mg, oral, Daily    propranolol (INDERAL) 10 mg, oral, 2 times daily PRN    Vyvanse 60 mg capsule Every 24 hours      Objective    BSA: There is no height or weight on file to calculate BSA.  There were no vitals taken for this visit.     Physical Exam  General: AAOx3 in NAD   Psych: mood and affect are appropiate. Able to consent.     Assessment/Plan         Problem List Items Addressed This Visit             ICD-10-CM    PCOS (polycystic ovarian syndrome) - Primary E28.2    Relevant Orders    Testosterone,Free and Total    DHEA-Sulfate    17-Hydroxyprogesterone    Insulin, Fasting    Glucose, Fasting    Referral to Nutrition Services    Irregular periods/menstrual cycles N92.6    Relevant Orders    Follicle Stimulating Hormone    Luteinizing Hormone    Estradiol    Progesterone    Prolactin    Human Chorionic Gonadotropin, Serum Quantitative     Other Visit Diagnoses         Codes    Weight gain     R63.5    Relevant Orders    Cortisol AM    Referral to Nutrition Services    Localized adiposity of neck     E65    Relevant Orders    Cortisol AM             Discussed with patient stopping birth control pills, then getting blood work done approximately 3 months after stopping them for accurate readings.  Patient okay then to resume.  Blood work will  be obtained at patient's request as she does report changes with the shape of her face and some fat on the back of her neck, patient particularly interested in cortisol levels getting checked.  Did discuss should any abnormalities exist would refer patient to endocrinology.  Patient's PCP did not want her to get endocrinology referral until she saw GYN, so lab work will be drawn and will await results.  Patient wanted referral for dietitian so that referral was placed.  Patient encouraged to not give up and to continue being persistent with attempting diet and exercise.

## 2024-07-26 ENCOUNTER — LAB (OUTPATIENT)
Dept: LAB | Facility: LAB | Age: 29
End: 2024-07-26
Payer: COMMERCIAL

## 2024-07-26 DIAGNOSIS — E28.2 PCOS (POLYCYSTIC OVARIAN SYNDROME): ICD-10-CM

## 2024-07-26 DIAGNOSIS — N92.6 IRREGULAR PERIODS/MENSTRUAL CYCLES: ICD-10-CM

## 2024-07-26 LAB
GLUCOSE P FAST SERPL-MCNC: 105 MG/DL (ref 74–99)
HCG SERPL-ACNC: <1 MIU/ML
INSULIN P FAST SERPL-ACNC: 21 UIU/ML (ref 3–25)

## 2024-07-26 PROCEDURE — 82947 ASSAY GLUCOSE BLOOD QUANT: CPT

## 2024-07-26 PROCEDURE — 36415 COLL VENOUS BLD VENIPUNCTURE: CPT

## 2024-07-26 PROCEDURE — 84702 CHORIONIC GONADOTROPIN TEST: CPT

## 2024-07-26 PROCEDURE — 83525 ASSAY OF INSULIN: CPT

## 2024-08-08 ENCOUNTER — OFFICE VISIT (OUTPATIENT)
Dept: NEUROLOGY | Facility: HOSPITAL | Age: 29
End: 2024-08-08
Payer: COMMERCIAL

## 2024-08-08 VITALS
WEIGHT: 235 LBS | SYSTOLIC BLOOD PRESSURE: 138 MMHG | HEART RATE: 120 BPM | HEIGHT: 64 IN | DIASTOLIC BLOOD PRESSURE: 98 MMHG | BODY MASS INDEX: 40.12 KG/M2 | RESPIRATION RATE: 16 BRPM

## 2024-08-08 DIAGNOSIS — Z82.0 FAMILY HISTORY OF MS (MULTIPLE SCLEROSIS): ICD-10-CM

## 2024-08-08 DIAGNOSIS — G43.E09 CHRONIC MIGRAINE WITH AURA WITHOUT STATUS MIGRAINOSUS, NOT INTRACTABLE: ICD-10-CM

## 2024-08-08 DIAGNOSIS — R00.0 TACHYCARDIA: ICD-10-CM

## 2024-08-08 DIAGNOSIS — M06.09 POLYARTHRITIS WITH NEGATIVE RHEUMATOID FACTOR (MULTI): ICD-10-CM

## 2024-08-08 PROCEDURE — 99215 OFFICE O/P EST HI 40 MIN: CPT | Performed by: PSYCHIATRY & NEUROLOGY

## 2024-08-08 PROCEDURE — 3008F BODY MASS INDEX DOCD: CPT | Performed by: PSYCHIATRY & NEUROLOGY

## 2024-08-08 PROCEDURE — 99417 PROLNG OP E/M EACH 15 MIN: CPT | Performed by: PSYCHIATRY & NEUROLOGY

## 2024-08-08 PROCEDURE — 1036F TOBACCO NON-USER: CPT | Performed by: PSYCHIATRY & NEUROLOGY

## 2024-08-08 ASSESSMENT — PATIENT HEALTH QUESTIONNAIRE - PHQ9
10. IF YOU CHECKED OFF ANY PROBLEMS, HOW DIFFICULT HAVE THESE PROBLEMS MADE IT FOR YOU TO DO YOUR WORK, TAKE CARE OF THINGS AT HOME, OR GET ALONG WITH OTHER PEOPLE: NOT DIFFICULT AT ALL
2. FEELING DOWN, DEPRESSED OR HOPELESS: SEVERAL DAYS
SUM OF ALL RESPONSES TO PHQ9 QUESTIONS 1 AND 2: 2
1. LITTLE INTEREST OR PLEASURE IN DOING THINGS: SEVERAL DAYS

## 2024-08-08 ASSESSMENT — PAIN SCALES - GENERAL: PAINLEVEL: 2

## 2024-08-08 NOTE — PROGRESS NOTES
"[facility] AUTONOMIC PROGRAM         Isreal Turpin MD    Professor of Neurology  [facility]  Senior Attending Physician- [facility]  Director, Autonomic Program and Laboratories  Medical Director, [place] [address]  Office: [phone]  Assistant: Tamika Deleon (email: [email or web address])       AUTONOMIC NERVOUS SYSTEM CONSULTATION    Patient Information     Medical Record Number: [id number]   YOB: 1995    Home Address: [address] [place], [place] [address]   Phone Number:  [phone]      Primary Care Physician: Monica Ovalles, APRN-CNP    Referring Physician: Mar Fernandez MD  [address]  [facility] [address]  [place] [address]    Patient accompanied by: Self  In addition to attending physician, patient seen by: Artie Mccray RN    Clinical Scores    Compass 31 : 49      IMPRESSION:    It is my impression that Chanel has POTS.  I think it is autoimmune mediated.  There could be an element of small fiber neuropathy presents by history and on examination.  An autonomic neuropathy is therefore likely.    PLAN/RECOMMENDATIONS:     I am going to work her up for an etiology and I will confirm the diagnosis of POTS versus autonomic neuropathy/small fiber neuropathy or both with autonomic testing.  We will obtain an EMG.  We will also do a lip biopsy looking for Sjogren's given her history of SLE.    I have asked her to stay on her current medication regimen.  And I have given her the 5 conservative measures.  Ms. Mata does complain of orthostatic intolerance, which, in the absence of a definite core cause, will need to be treated symptomatically. I would recommend following the \"5 conservative measures\":   1. Wearing waist-high compression stockings 40-50 mmHg in compression, every day as long she is up and about and removing them at " bedtime  2. Drinking one gallon of water a day  3. Eating at least 6 gm of salt (roughly 3 teaspoons) throughout her day.  4. Elevating the head of her bed 45 degrees (not by stacking pillows, but bending her mattress up and stacking bricks underneath).  5. Jogging in water 1 hour every day    These measures could be tried strictly for a period of 3 to 4 months, until our next follow-up meeting.     HPI    Chanel Mata is a 28 y.o. right}-handed female presenting to the  Autonomic Program for the evaluation of orthostatic intolerance.    History details   Diagnosed a year ago with SLE.  She has a history of orthostatic lightheadedness since childhood. Also of rapid resting HR and rapid HR upon exercising.  Daily, upon getting out of bed, or getting off the toilet, she feels lightheaded, with visual obscuration. No syncope.  She has always had hypohidrosis until 2 years ago: sudden loss of bladder function for 1 month, during the summer, 2 years ago, unknown cause. Was using a catheter. Bladder function returned as it went away, spontaneously. Since then, she started sweating profusely from the face and trunk, with minimal activity. Laying down relieves it.  Other autonomic symptoms worth mentioning: blurred vision at night while driving due to lights  Inability to wear contacts starting a year ago due to irritation. Dry mouth.  GI symptoms of diarrhea > constipation, early satiety and bloating.    Relevant Past Medical History:  SLE  Skin rashes      AUTONOMIC REVIEW OF SYSTEMS    COMPASS 31 for research purposes only (see below)  Point Value Question Answer Options   1 1. In the past year, have you ever felt faint, dizzy, “goofy”, or had difficulty thinking soon after standing up from a sitting or lying position? Yes   3 2. When standing up, how frequently do you get these feelings or symptoms? Almost Always   2 3. How would you rate the severity of these feelings or symptoms? Moderate   1 4. In the past year,  have these feelings or symptoms that you have experienced: Stayed about the same   7     28     0 5. In the past year, have you ever noticed color changes in your skin, such as red, white, or purple? (If you answer no, please skip to question 8) No   0 6. What parts of your body are affected by these color changes?    0 7. Have these changes in your skin color:    0     0     1 8. In the past 5 years, what changes, if any, have occurred in your general body sweating? I sweat much more than I used to   0 9. Do your eyes feel excessively dry? No   1 10. Does you mouth feel excessively dry? Yes   1 11. For the symptom of dry eyes or dry mouth that you have had for the longest period of time, has this symptom: Stayed about the same   3     6     0 12. In the past year, have you noticed any changes in how quickly you get full when eating a meal? I haven’t noticed any change   1 13. In the past year, have you felt excessively full or persistently full (bloated feeling) after a meal? Sometimes   1 14. In the past year, have you vomited after a meal? Sometimes   1 15. In the past year, have you had a cramping or colicky abdominal pain? Sometimes   1 16. In the past year, have you had any bouts of diarrhea? (If you answer no, please skip to question 20) Yes   1 17. How frequently does this occur? Occasionally   2 18. How severe are these bouts of diarrhea? Moderate   2 19. Have your bouts of diarrhea gotten: Gotten somewhat worse   1 20. In the past year, have you been constipated? (If you answer no, please skip to question 24) Yes   1 21. How frequently are you constipated? Occasionally   1 22. How severe are these episodes of constipation? Mild   1 23. Has your constipation gotten: Stayed about the same   13     12     1 24. In the past year, have you ever lost control of your bladder function? Occasionally   0 25. In the past year, have you had difficulty passing urine? Never   0 26. In the past year, have you had trouble  completely emptying your bladder? Never   1     1     2 27. In the past year, without sunglasses or tinted glasses, has bright light bothered your eyes? (If you katelyn never, please skip to question 29) Frequently   2 28. How severe is this sensitivity to bright light? Moderate   0 29. In the past year, have you had trouble focusing your eyes? (If you katelyn never, please skip to question 31) Never   0 30. How severe is this focusing problem?    2 31. Has the most troublesome symptom with your eyes (i.e. sensitivity to bright light or trouble focusing) gotten: Gotten somewhat worse   6     [id number]          TOTAL     49 /100      Additional Autonomic Review of Systems    Genitourinary    a. Erectile dysfunction N/A  b. Ejaculation dysfunction N/A  c. Vaginal dryness No  d. Difficulty climaxing No    Autoimmune symptoms    a. Chronic joint pain Yes  b. Chronic skin disease Yes  c. Chronic muscle pain No    GENERAL EXAMINATION    Overall appearance: NAD, pleasant, overweight, well-kempt    Extremities: normal    NEUROLOGICAL EXAMINATION    A. Cognition and Language  1. Patient is alert, oriented to time, place and persons  2. Language normal  3. Dysarthria: not present  4. Memory: no apparent deficit  5. MMSE: N/A    B. Cranial Nerves  1. Fundi: not examined  2. Olfaction: not tested  3. Eye movements: EOMI   4. Pupils: PERRLA  5. Facial sensation: normal  6. Facial motor: normal  7. Hearing: normal bilaterally  8. Palate: elevates symmetrically bilaterally  9. SCM: normal strength  10. Tongue: midline    C. Motor examination (MRC [0 to 5] or modified MRC [pluses and minuses] classification)  5 MRC throughout including toe ext and flex      D. Sensory examination  1. In the Lower Extremity    a. There is a sensory gradient to pinprick, distal to proximal to approximately the lower 1/3 of the shin   b. There is a vibration perception gradient distal to proximal (based on a [employer] Unique C-64Hz tuning fork)    i. At the  toe: 5    ii. At the MM: 8   c. Joint position sense intact at the toe  2. In the upper extremity: gradient in gloves distribution: YES . Up to: mid hand dorsal aspect    E. Reflexes (NINDS classification 0 to 4)    Biceps:   Right 2 Left 2  Triceps:   Right 2 Left 2  BR:    Right + Left +  Knee:    Right 2 Left 2  Ankles:   Right 2 Left 2    Additional reflexes if relevant (Absent or Present):     Babinski:   Right  Left  Pectorals:   Right  Left  Hamstrings:   Right  Left  Arriaga:   Right  Left  Palmomental:   Right  Left  Snout:    ight   Left  Grab:    Right   Left  Jaw jerk:   Absent  Glabellar:   Absent    F. Coordination    F to N: Normal  H to S:  BEVERLEY:   FFM: Normal    G. Gait    Patient felt lightheaded upon standing-up from supine position: Yes    Normal gait  Normal base  Heel walking: Right Normal Left   Normal  Tiptoeing:  Right Normal Left   Normal  Tandem: Normal  Romberg test: Normal    I spent 90 minutes with the patient, at least 50% of which were dedicated to education and detailing diagnostic plans and management.      Isreal Turpin MD

## 2024-08-10 ENCOUNTER — LAB (OUTPATIENT)
Dept: LAB | Facility: LAB | Age: 29
End: 2024-08-10
Payer: COMMERCIAL

## 2024-08-10 DIAGNOSIS — E65: ICD-10-CM

## 2024-08-10 DIAGNOSIS — R00.0 TACHYCARDIA: ICD-10-CM

## 2024-08-10 DIAGNOSIS — R63.5 WEIGHT GAIN: ICD-10-CM

## 2024-08-10 LAB
ANION GAP SERPL CALC-SCNC: 15 MMOL/L
BASOPHILS # BLD AUTO: 0.04 X10*3/UL (ref 0–0.1)
BASOPHILS NFR BLD AUTO: 0.5 %
BUN SERPL-MCNC: 10 MG/DL (ref 8–25)
CALCIUM SERPL-MCNC: 9.2 MG/DL (ref 8.5–10.4)
CHLORIDE SERPL-SCNC: 101 MMOL/L (ref 97–107)
CO2 SERPL-SCNC: 23 MMOL/L (ref 24–31)
CORTIS AM PEAK SERPL-MSCNC: 13.2 UG/DL (ref 5–20)
CREAT SERPL-MCNC: 0.8 MG/DL (ref 0.4–1.6)
CRP SERPL-MCNC: 0.4 MG/DL (ref 0–2)
EGFRCR SERPLBLD CKD-EPI 2021: >90 ML/MIN/1.73M*2
EOSINOPHIL # BLD AUTO: 0.15 X10*3/UL (ref 0–0.7)
EOSINOPHIL NFR BLD AUTO: 1.7 %
ERYTHROCYTE [DISTWIDTH] IN BLOOD BY AUTOMATED COUNT: 12.2 % (ref 11.5–14.5)
ERYTHROCYTE [SEDIMENTATION RATE] IN BLOOD BY WESTERGREN METHOD: 8 MM/H (ref 0–20)
GLUCOSE SERPL-MCNC: 97 MG/DL (ref 65–99)
HCT VFR BLD AUTO: 43.3 % (ref 36–46)
HGB BLD-MCNC: 14.7 G/DL (ref 12–16)
IMM GRANULOCYTES # BLD AUTO: 0.03 X10*3/UL (ref 0–0.7)
IMM GRANULOCYTES NFR BLD AUTO: 0.3 % (ref 0–0.9)
LYMPHOCYTES # BLD AUTO: 3.08 X10*3/UL (ref 1.2–4.8)
LYMPHOCYTES NFR BLD AUTO: 35.8 %
MCH RBC QN AUTO: 29.7 PG (ref 26–34)
MCHC RBC AUTO-ENTMCNC: 33.9 G/DL (ref 32–36)
MCV RBC AUTO: 88 FL (ref 80–100)
MONOCYTES # BLD AUTO: 0.58 X10*3/UL (ref 0.1–1)
MONOCYTES NFR BLD AUTO: 6.7 %
NEUTROPHILS # BLD AUTO: 4.72 X10*3/UL (ref 1.2–7.7)
NEUTROPHILS NFR BLD AUTO: 55 %
NRBC BLD-RTO: 0 /100 WBCS (ref 0–0)
PLATELET # BLD AUTO: 231 X10*3/UL (ref 150–450)
POTASSIUM SERPL-SCNC: 4.4 MMOL/L (ref 3.4–5.1)
PROT SERPL-MCNC: 6.7 G/DL (ref 6.4–8.2)
RBC # BLD AUTO: 4.95 X10*6/UL (ref 4–5.2)
RHEUMATOID FACT SER NEPH-ACNC: <10 IU/ML (ref 0–15)
SODIUM SERPL-SCNC: 139 MMOL/L (ref 133–145)
T3FREE SERPL-MCNC: 3.5 PG/ML (ref 2.3–4.2)
T4 SERPL-MCNC: 8.6 UG/DL (ref 4.5–11.1)
THYROPEROXIDASE AB SERPL-ACNC: 37 IU/ML
TSH SERPL DL<=0.05 MIU/L-ACNC: 1.93 MIU/L (ref 0.27–4.2)
VIT B12 SERPL-MCNC: 416 PG/ML (ref 211–946)
WBC # BLD AUTO: 8.6 X10*3/UL (ref 4.4–11.3)

## 2024-08-10 PROCEDURE — 83519 RIA NONANTIBODY: CPT

## 2024-08-10 PROCEDURE — 86255 FLUORESCENT ANTIBODY SCREEN: CPT

## 2024-08-10 PROCEDURE — 84481 FREE ASSAY (FT-3): CPT

## 2024-08-10 PROCEDURE — 84207 ASSAY OF VITAMIN B-6: CPT

## 2024-08-10 PROCEDURE — 36415 COLL VENOUS BLD VENIPUNCTURE: CPT

## 2024-08-10 PROCEDURE — 86235 NUCLEAR ANTIGEN ANTIBODY: CPT

## 2024-08-10 PROCEDURE — 84446 ASSAY OF VITAMIN E: CPT

## 2024-08-10 PROCEDURE — 82085 ASSAY OF ALDOLASE: CPT

## 2024-08-10 PROCEDURE — 86140 C-REACTIVE PROTEIN: CPT

## 2024-08-10 PROCEDURE — 83921 ORGANIC ACID SINGLE QUANT: CPT

## 2024-08-10 PROCEDURE — 84155 ASSAY OF PROTEIN SERUM: CPT

## 2024-08-10 PROCEDURE — 86334 IMMUNOFIX E-PHORESIS SERUM: CPT

## 2024-08-10 PROCEDURE — 82533 TOTAL CORTISOL: CPT

## 2024-08-10 PROCEDURE — 86800 THYROGLOBULIN ANTIBODY: CPT

## 2024-08-10 PROCEDURE — 86376 MICROSOMAL ANTIBODY EACH: CPT

## 2024-08-10 PROCEDURE — 83516 IMMUNOASSAY NONANTIBODY: CPT

## 2024-08-10 PROCEDURE — 82390 ASSAY OF CERULOPLASMIN: CPT

## 2024-08-10 PROCEDURE — 86225 DNA ANTIBODY NATIVE: CPT

## 2024-08-10 PROCEDURE — 80048 BASIC METABOLIC PNL TOTAL CA: CPT

## 2024-08-10 PROCEDURE — 85652 RBC SED RATE AUTOMATED: CPT

## 2024-08-10 PROCEDURE — 82384 ASSAY THREE CATECHOLAMINES: CPT

## 2024-08-10 PROCEDURE — 86596 VOLTAGE-GTD CA CHNL ANTB EA: CPT

## 2024-08-10 PROCEDURE — 84436 ASSAY OF TOTAL THYROXINE: CPT

## 2024-08-10 PROCEDURE — 83521 IG LIGHT CHAINS FREE EACH: CPT

## 2024-08-10 PROCEDURE — 86738 MYCOPLASMA ANTIBODY: CPT

## 2024-08-10 PROCEDURE — 85025 COMPLETE CBC W/AUTO DIFF WBC: CPT

## 2024-08-10 PROCEDURE — 86038 ANTINUCLEAR ANTIBODIES: CPT

## 2024-08-10 PROCEDURE — 82525 ASSAY OF COPPER: CPT

## 2024-08-10 PROCEDURE — 84443 ASSAY THYROID STIM HORMONE: CPT

## 2024-08-10 PROCEDURE — 86431 RHEUMATOID FACTOR QUANT: CPT

## 2024-08-10 PROCEDURE — 84165 PROTEIN E-PHORESIS SERUM: CPT

## 2024-08-10 PROCEDURE — 82542 COL CHROMOTOGRAPHY QUAL/QUAN: CPT

## 2024-08-10 PROCEDURE — 83090 ASSAY OF HOMOCYSTEINE: CPT

## 2024-08-10 PROCEDURE — 82607 VITAMIN B-12: CPT

## 2024-08-11 LAB
CENTROMERE B AB SER-ACNC: <0.2 AI
CERULOPLASMIN SERPL-MCNC: 39.7 MG/DL (ref 20–60)
CHROMATIN AB SERPL-ACNC: <0.2 AI
DSDNA AB SER-ACNC: <1 IU/ML
ENA JO1 AB SER QL IA: <0.2 AI
ENA RNP AB SER IA-ACNC: <0.2 AI
ENA SCL70 AB SER QL IA: <0.2 AI
ENA SM AB SER IA-ACNC: <0.2 AI
ENA SM+RNP AB SER QL IA: <0.2 AI
ENA SS-A AB SER IA-ACNC: <0.2 AI
ENA SS-B AB SER IA-ACNC: <0.2 AI
GLIADIN PEPTIDE IGA SER IA-ACNC: <1 U/ML
KAPPA LC SERPL-MCNC: 1.45 MG/DL (ref 0.33–1.94)
KAPPA LC/LAMBDA SER: 0.99 {RATIO} (ref 0.26–1.65)
LAMBDA LC SERPL-MCNC: 1.46 MG/DL (ref 0.57–2.63)
RIBOSOMAL P AB SER-ACNC: <0.2 AI
TTG IGA SER IA-ACNC: <1 U/ML

## 2024-08-12 LAB
ALDOLASE SERPL-CCNC: 5.6 U/L (ref 1.2–7.6)
GLIADIN PEPTIDE IGG SER IA-ACNC: <0.56 FLU (ref 0–4.99)
HCYS SERPL-SCNC: 12.11 UMOL/L (ref 5–13.9)
THYROGLOB AB SERPL-ACNC: <0.9 IU/ML (ref 0–4)
TTG IGG SER IA-ACNC: <0.82 FLU (ref 0–4.99)

## 2024-08-13 DIAGNOSIS — R00.0 TACHYCARDIA: ICD-10-CM

## 2024-08-13 LAB
ANA SER QL HEP2 SUBST: NEGATIVE
COPPER SERPL-MCNC: 157.8 UG/DL (ref 80–155)
M PNEUMO IGG SER IA-ACNC: 0.01 U/L
M PNEUMO IGM SER IA-ACNC: 0.12 U/L

## 2024-08-14 LAB
A-TOCOPHEROL VIT E SERPL-MCNC: 7.9 MG/L (ref 5.5–18)
ALBUMIN: 3.8 G/DL (ref 3.4–5)
ALPHA 1 GLOBULIN: 0.3 G/DL (ref 0.2–0.6)
ALPHA 2 GLOBULIN: 0.7 G/DL (ref 0.4–1.1)
BETA GLOBULIN: 1 G/DL (ref 0.5–1.2)
BETA+GAMMA TOCOPHEROL SERPL-MCNC: 1.2 MG/L (ref 0–6)
GAMMA GLOBULIN: 0.9 G/DL (ref 0.5–1.4)
IMMUNOFIXATION COMMENT: NORMAL
PATH REVIEW - SERUM IMMUNOFIXATION: NORMAL
PATH REVIEW-SERUM PROTEIN ELECTROPHORESIS: NORMAL
PCA IGG SER-ACNC: 1.7 UNITS (ref 0–24.9)
PROTEIN ELECTROPHORESIS COMMENT: NORMAL

## 2024-08-15 LAB
METHYLMALONATE SERPL-SCNC: <0.1 UMOL/L (ref 0–0.4)
PYRIDOXAL PHOS SERPL-SCNC: 44.9 NMOL/L (ref 20–125)

## 2024-08-16 LAB
3OH-DODECANOYLCARN SERPL-SCNC: <0.01 UMOL/L
3OH-ISOVALERYLCARN SERPL-SCNC: 0.01 UMOL/L
3OH-LINOLEOYLCARN SERPL-SCNC: <0.01 UMOL/L
3OH-OLEOYLCARN SERPL-SCNC: 0.01 UMOL/L
3OH-PALMITOLEYLCARN SERPL-SCNC: <0.01 UMOL/L
3OH-PALMITOYLCARN SERPL-SCNC: <0.01 UMOL/L
3OH-STEAROYLCARN SERPL-SCNC: <0.01 UMOL/L
3OH-TDECANOYLCARN SERPL-SCNC: <0.01 UMOL/L
3OH-TDECENOYLCARN SERPL-SCNC: 0.01 UMOL/L
ACETYLCARN SERPL-SCNC: 6.47 UMOL/L (ref 2.93–15.06)
ACYLCARNITINE PATTERN SERPL-IMP: NORMAL
AMPHIPHYSIN IGG SER QL IA: NEGATIVE
ANNOTATION COMMENT IMP: NORMAL
BUTYRYL+ISOBUTYRYLCARN SERPL-SCNC: 0.14 UMOL/L
CARN ESTERS SERPL-SCNC: 8 UMOL/L (ref 5–29)
CARN ESTERS/C0 SERPL-SRTO: 0.2 RATIO (ref 0.1–1)
CARNITINE FREE SERPL-SCNC: 34 UMOL/L (ref 25–60)
CARNITINE SERPL-SCNC: 42 UMOL/L (ref 34–86)
CV2 AB SERPL QL IF: NEGATIVE
DECANOYLCARN SERPL-SCNC: 0.13 UMOL/L
DECENOYLCARN SERPL-SCNC: 0.13 UMOL/L
DODECANOYLCARN SERPL-SCNC: 0.04 UMOL/L
DODECENOYLCARN SERPL-SCNC: 0.06 UMOL/L
DOPAMINE SERPL-MCNC: 38 PG/ML (ref 0–48)
EPINEPH PLAS-MCNC: 69 PG/ML (ref 0–62)
GLIAL NUC TYPE 1 AB SER QL IF: NEGATIVE
GLUTARYLCARN SERPL-SCNC: 0.08 UMOL/L
HEXANOYLCARN SERPL-SCNC: 0.05 UMOL/L
HU1 AB SER QL: NEGATIVE
HU2 AB SER QL IF: NEGATIVE
HU3 AB SER QL: NEGATIVE
ISOVALERYL+MEBUTYRYLCARN SERPL-SCNC: 0.04 UMOL/L
LINOLEOYLCARN SERPL-SCNC: 0.04 UMOL/L
NOREPINEPH PLAS-MCNC: 905 PG/ML (ref 0–874)
OCTANOYLCARN SERPL-SCNC: 0.08 UMOL/L
OCTENOYLCARN SERPL-SCNC: 0.21 UMOL/L
OLEOYLCARN SERPL-SCNC: 0.08 UMOL/L
PALMITOLEYLCARN SERPL-SCNC: 0.02 UMOL/L
PALMITOYLCARN SERPL-SCNC: 0.05 UMOL/L
PARANEOPLASTIC AB SER-IMP: NORMAL
PCA-1 AB SER QL IF: NEGATIVE
PCA-2 AB SER QL IF: NEGATIVE
PCA-TR AB SER QL IF: NEGATIVE
PROPIONYLCARN SERPL-SCNC: 0.28 UMOL/L
STEAROYLCARN SERPL-SCNC: 0.02 UMOL/L
TDECADIENOYLCARN SERPL-SCNC: 0.03 UMOL/L
TDECANOYLCARN SERPL-SCNC: 0.02 UMOL/L
TDECENOYLCARN SERPL-SCNC: 0.04 UMOL/L
VGCC-P/Q BIND IGG+IGM SER IA-SCNC: 0 NMOL/L
VGKC IGG+IGM SER IA-SCNC: 0 NMOL/L

## 2024-08-17 LAB
SCAN RESULT: NORMAL

## 2024-08-20 LAB — SCAN RESULT: NORMAL

## 2024-09-03 RX ORDER — LISDEXAMFETAMINE DIMESYLATE 50 MG/1
CAPSULE ORAL
COMMUNITY
Start: 2024-07-22

## 2024-09-11 ENCOUNTER — APPOINTMENT (OUTPATIENT)
Dept: OTOLARYNGOLOGY | Facility: CLINIC | Age: 29
End: 2024-09-11
Payer: COMMERCIAL

## 2024-09-24 ENCOUNTER — APPOINTMENT (OUTPATIENT)
Dept: RHEUMATOLOGY | Facility: CLINIC | Age: 29
End: 2024-09-24
Payer: COMMERCIAL

## 2024-09-27 ENCOUNTER — APPOINTMENT (OUTPATIENT)
Dept: RHEUMATOLOGY | Facility: CLINIC | Age: 29
End: 2024-09-27
Payer: COMMERCIAL

## 2024-10-08 ENCOUNTER — LAB (OUTPATIENT)
Dept: LAB | Facility: LAB | Age: 29
End: 2024-10-08
Payer: COMMERCIAL

## 2024-10-08 ENCOUNTER — HOSPITAL ENCOUNTER (OUTPATIENT)
Dept: NEUROLOGY | Facility: HOSPITAL | Age: 29
Discharge: HOME | End: 2024-10-08
Payer: COMMERCIAL

## 2024-10-08 DIAGNOSIS — E55.9 VITAMIN D DEFICIENCY: ICD-10-CM

## 2024-10-08 DIAGNOSIS — R00.0 TACHYCARDIA: ICD-10-CM

## 2024-10-08 DIAGNOSIS — M06.09 POLYARTHRITIS WITH NEGATIVE RHEUMATOID FACTOR (MULTI): ICD-10-CM

## 2024-10-08 DIAGNOSIS — R73.9 HYPERGLYCEMIA: ICD-10-CM

## 2024-10-08 LAB
25(OH)D3 SERPL-MCNC: 31 NG/ML (ref 30–100)
ALBUMIN SERPL BCP-MCNC: 4.2 G/DL (ref 3.4–5)
ALP SERPL-CCNC: 78 U/L (ref 33–110)
ALT SERPL W P-5'-P-CCNC: 35 U/L (ref 7–45)
ANION GAP SERPL CALC-SCNC: 13 MMOL/L (ref 10–20)
APPEARANCE UR: CLEAR
AST SERPL W P-5'-P-CCNC: 28 U/L (ref 9–39)
BASOPHILS # BLD AUTO: 0.02 X10*3/UL (ref 0–0.1)
BASOPHILS NFR BLD AUTO: 0.2 %
BILIRUB SERPL-MCNC: 0.4 MG/DL (ref 0–1.2)
BILIRUB UR STRIP.AUTO-MCNC: NEGATIVE MG/DL
BUN SERPL-MCNC: 12 MG/DL (ref 6–23)
CALCIUM SERPL-MCNC: 9.5 MG/DL (ref 8.6–10.6)
CHLORIDE SERPL-SCNC: 103 MMOL/L (ref 98–107)
CK SERPL-CCNC: 124 U/L (ref 0–215)
CO2 SERPL-SCNC: 27 MMOL/L (ref 21–32)
COLOR UR: ABNORMAL
CREAT SERPL-MCNC: 0.67 MG/DL (ref 0.5–1.05)
CRP SERPL-MCNC: 0.45 MG/DL
EGFRCR SERPLBLD CKD-EPI 2021: >90 ML/MIN/1.73M*2
EOSINOPHIL # BLD AUTO: 0.16 X10*3/UL (ref 0–0.7)
EOSINOPHIL NFR BLD AUTO: 1.7 %
ERYTHROCYTE [DISTWIDTH] IN BLOOD BY AUTOMATED COUNT: 12.4 % (ref 11.5–14.5)
ERYTHROCYTE [SEDIMENTATION RATE] IN BLOOD BY WESTERGREN METHOD: 5 MM/H (ref 0–20)
EST. AVERAGE GLUCOSE BLD GHB EST-MCNC: 111 MG/DL
GLUCOSE SERPL-MCNC: 97 MG/DL (ref 74–99)
GLUCOSE UR STRIP.AUTO-MCNC: NORMAL MG/DL
HBA1C MFR BLD: 5.5 %
HCT VFR BLD AUTO: 43.3 % (ref 36–46)
HGB BLD-MCNC: 14.2 G/DL (ref 12–16)
IMM GRANULOCYTES # BLD AUTO: 0.05 X10*3/UL (ref 0–0.7)
IMM GRANULOCYTES NFR BLD AUTO: 0.5 % (ref 0–0.9)
KETONES UR STRIP.AUTO-MCNC: NEGATIVE MG/DL
LEUKOCYTE ESTERASE UR QL STRIP.AUTO: NEGATIVE
LYMPHOCYTES # BLD AUTO: 3.77 X10*3/UL (ref 1.2–4.8)
LYMPHOCYTES NFR BLD AUTO: 40.5 %
MCH RBC QN AUTO: 28.9 PG (ref 26–34)
MCHC RBC AUTO-ENTMCNC: 32.8 G/DL (ref 32–36)
MCV RBC AUTO: 88 FL (ref 80–100)
MONOCYTES # BLD AUTO: 0.73 X10*3/UL (ref 0.1–1)
MONOCYTES NFR BLD AUTO: 7.8 %
MUCOUS THREADS #/AREA URNS AUTO: NORMAL /LPF
NEUTROPHILS # BLD AUTO: 4.57 X10*3/UL (ref 1.2–7.7)
NEUTROPHILS NFR BLD AUTO: 49.3 %
NITRITE UR QL STRIP.AUTO: NEGATIVE
NRBC BLD-RTO: 0 /100 WBCS (ref 0–0)
PH UR STRIP.AUTO: 5.5 [PH]
PLATELET # BLD AUTO: 266 X10*3/UL (ref 150–450)
POTASSIUM SERPL-SCNC: 4.2 MMOL/L (ref 3.5–5.3)
PROT SERPL-MCNC: 6.8 G/DL (ref 6.4–8.2)
PROT UR STRIP.AUTO-MCNC: NEGATIVE MG/DL
RBC # BLD AUTO: 4.91 X10*6/UL (ref 4–5.2)
RBC # UR STRIP.AUTO: ABNORMAL /UL
RBC #/AREA URNS AUTO: NORMAL /HPF
SODIUM SERPL-SCNC: 139 MMOL/L (ref 136–145)
SP GR UR STRIP.AUTO: 1.02
SQUAMOUS #/AREA URNS AUTO: NORMAL /HPF
UROBILINOGEN UR STRIP.AUTO-MCNC: NORMAL MG/DL
WBC # BLD AUTO: 9.3 X10*3/UL (ref 4.4–11.3)
WBC #/AREA URNS AUTO: NORMAL /HPF

## 2024-10-08 PROCEDURE — 85652 RBC SED RATE AUTOMATED: CPT

## 2024-10-08 PROCEDURE — 80053 COMPREHEN METABOLIC PANEL: CPT

## 2024-10-08 PROCEDURE — 86140 C-REACTIVE PROTEIN: CPT

## 2024-10-08 PROCEDURE — 83036 HEMOGLOBIN GLYCOSYLATED A1C: CPT

## 2024-10-08 PROCEDURE — 81001 URINALYSIS AUTO W/SCOPE: CPT

## 2024-10-08 PROCEDURE — 95912 NRV CNDJ TEST 11-12 STUDIES: CPT | Performed by: PSYCHIATRY & NEUROLOGY

## 2024-10-08 PROCEDURE — 85025 COMPLETE CBC W/AUTO DIFF WBC: CPT

## 2024-10-08 PROCEDURE — 95924 ANS PARASYMP & SYMP W/TILT: CPT | Performed by: PSYCHIATRY & NEUROLOGY

## 2024-10-08 PROCEDURE — 82306 VITAMIN D 25 HYDROXY: CPT

## 2024-10-08 PROCEDURE — 95886 MUSC TEST DONE W/N TEST COMP: CPT | Performed by: PSYCHIATRY & NEUROLOGY

## 2024-10-08 PROCEDURE — 95923 AUTONOMIC NRV SYST FUNJ TEST: CPT | Performed by: PSYCHIATRY & NEUROLOGY

## 2024-10-08 PROCEDURE — 36415 COLL VENOUS BLD VENIPUNCTURE: CPT

## 2024-10-08 PROCEDURE — 82550 ASSAY OF CK (CPK): CPT

## 2024-10-09 LAB — HOLD SPECIMEN: NORMAL

## 2024-10-17 LAB — SCAN RESULT: NORMAL

## 2024-10-24 ENCOUNTER — OFFICE VISIT (OUTPATIENT)
Dept: RHEUMATOLOGY | Facility: CLINIC | Age: 29
End: 2024-10-24
Payer: COMMERCIAL

## 2024-10-24 VITALS
OXYGEN SATURATION: 98 % | BODY MASS INDEX: 44.63 KG/M2 | HEIGHT: 64 IN | DIASTOLIC BLOOD PRESSURE: 111 MMHG | WEIGHT: 261.4 LBS | HEART RATE: 106 BPM | SYSTOLIC BLOOD PRESSURE: 147 MMHG

## 2024-10-24 DIAGNOSIS — K13.79 MOUTH SORES: ICD-10-CM

## 2024-10-24 DIAGNOSIS — M06.09 POLYARTHRITIS WITH NEGATIVE RHEUMATOID FACTOR (MULTI): Primary | ICD-10-CM

## 2024-10-24 PROCEDURE — 99213 OFFICE O/P EST LOW 20 MIN: CPT | Performed by: INTERNAL MEDICINE

## 2024-10-24 PROCEDURE — 3008F BODY MASS INDEX DOCD: CPT | Performed by: INTERNAL MEDICINE

## 2024-10-24 RX ORDER — LEUCOVORIN CALCIUM 10 MG/1
10 TABLET ORAL DAILY
Qty: 30 TABLET | Refills: 5 | Status: SHIPPED | OUTPATIENT
Start: 2024-10-24

## 2024-10-24 RX ORDER — PREDNISONE 10 MG/1
10 TABLET ORAL DAILY
Qty: 30 TABLET | Refills: 1 | Status: SHIPPED | OUTPATIENT
Start: 2024-10-24

## 2024-10-24 RX ORDER — SERTRALINE HYDROCHLORIDE 50 MG/1
TABLET, FILM COATED ORAL
COMMUNITY
Start: 2024-10-08

## 2024-10-24 ASSESSMENT — ROUTINE ASSESSMENT OF PATIENT INDEX DATA (RAPID3)
ON A SCALE OF ONE TO TEN, HOW MUCH PAIN HAVE YOU HAD BECAUSE OF YOUR CONDITION OVER THE PAST WEEK?: 2
WASH_DRY_BODY: WITH SOME DIFFICULTY
WALK_KILOMETERS: WITH MUCH DIFFICULTY
FEELINGS_DEPRESSION: WITH SOME DIFFICULTY
ON A SCALE OF ONE TO TEN, CONSIDERING ALL THE WAYS IN WHICH ILLNESS AND HEALTH CONDITIONS MAY AFFECT YOU AT THIS TIME, PLEASE INDICATE BELOW HOW YOU ARE DOING:: 4
ON A SCALE OF ONE TO TEN, CONSIDERING ALL THE WAYS IN WHICH ILLNESS AND HEALTH CONDITIONS MAY AFFECT YOU AT THIS TIME, PLEASE INDICATE BELOW HOW YOU ARE DOING:: 4
WALK_FLAT_GROUND: WITH SOME DIFFICULTY
GOOD_NIGHTS_SLEEP: WITHOUT ANY DIFFICULTY
ON A SCALE OF ONE TO TEN, HOW MUCH PAIN HAVE YOU HAD BECAUSE OF YOUR CONDITION OVER THE PAST WEEK?: 2
IN_OUT_BED: WITHOUT ANY DIFFICULTY
IN_OUT_TRANSPORT: WITHOUT ANY DIFFICULTY
WEIGHTED_TOTAL_SCORE: 2.77
LIFT_CUP_TO_MOUTH: WITHOUT ANY DIFFICULTY
FN_SCORE: 2.3
PICK_CLOTHES_OFF_FLOOR: WITH SOME DIFFICULTY
SUM OF QUESTIONS A TO J: 7
FEELINGS_ANXIETY_NERVOUS: WITH SOME DIFFICULTY
SEVERITY_SCORE: 0
SEVERITY_SCORE: MODERATE SEVERITY (MS)
DRESS_YOURSELF: WITHOUT ANY DIFFICULTY
TOTAL RAPID3 SCORE: 8.3
PARTIPATE_RECREATIONAL_ACTIVITIES: WITH MUCH DIFFICULTY
TURN_FAUCETS_OFF: WITHOUT ANY DIFFICULTY

## 2024-10-24 ASSESSMENT — ENCOUNTER SYMPTOMS
LOSS OF SENSATION IN FEET: 0
OCCASIONAL FEELINGS OF UNSTEADINESS: 1
DEPRESSION: 0

## 2024-10-24 ASSESSMENT — PAIN SCALES - GENERAL: PAINLEVEL_OUTOF10: 2

## 2024-10-24 ASSESSMENT — PAIN - FUNCTIONAL ASSESSMENT: PAIN_FUNCTIONAL_ASSESSMENT: 0-10

## 2024-10-24 NOTE — PROGRESS NOTES
[facility]/  Rheumatology  [facility], [address]  [place] [address]  Phone: [phone]  Fax: [phone]    Rheumatology Progress Note 3/20/24    Chanel Mata is a 29 y.o. female here for   Chief Complaint   Patient presents with    Follow-up       Last Visit:     Rheum Hx      Previous Tx    Health Maintenance  DXA  TB testing  Hep B  HepC  HIV  HSV  Lyme  COVID  Malignancy Hx  Immunization History   Administered Date(s) Administered    COVID-19, subunit, rS-nanoparticle, adjuvanted, PF, 5 mcg/0.5 mL 10/01/2024    HPV, Quadrivalent 11/02/2010, 03/03/2011    Influenza, injectable, quadrivalent 10/11/2018, 09/01/2021    Influenza, seasonal, injectable 01/11/2017    Moderna COVID-19 vaccine, 12 years and older (50mcg/0.5mL)(Spikevax) 10/17/2023    Moderna COVID-19 vaccine, bivalent, blue cap/gray label *Check age/dose* 09/07/2022    Moderna SARS-CoV-2 Vaccination 02/04/2021, 03/04/2021, 12/01/2021    Tdap vaccine, age 7 year and older (BOOSTRIX, ADACEL) 06/28/2011          Past Medical History:   Diagnosis Date    Chronic post-traumatic stress disorder     Depression     Lupus     Nontoxic diffuse goiter     PCOS (polycystic ovarian syndrome)     Personal history of other mental and behavioral disorders 08/23/2017    History of anxiety disorder    Personal history of other specified conditions 08/23/2017    History of headache    PTSD (post-traumatic stress disorder)       Past Surgical History:   Procedure Laterality Date    BREAST LUMPECTOMY  06/21/2018    Breast Surgery Lumpectomy, x3    ELECTROMYOGRAPHY  10/2024    INNER EAR SURGERY  08/23/2017    Inner Ear Surgery    TONSILLECTOMY  08/23/2017    Tonsillectomy      Current Outpatient Medications   Medication Sig Dispense Refill    Auvelity  mg tablet, IR and ER, biphasic       cholecalciferol (Vitamin D-3) 50 MCG (2000 UT) tablet Take 1 tablet (50 mcg) by mouth once daily.      hydroxychloroquine (Plaquenil) 200  "mg tablet Take 1 tablet (200 mg) by mouth 2 times a day. 60 tablet 11    hydrOXYzine HCL (Atarax) 25 mg tablet Take 1 tablet (25 mg) by mouth if needed. PRN      omeprazole (PriLOSEC) 40 mg DR capsule TAKE 1 CAPSULE BY MOUTH EVERY DAY 30 MINUTES BEFORE MORNING MEAL FOR 30 DAYS 90 capsule 3    prazosin (Minipress) 1 mg capsule once every 24 hours.      predniSONE (Deltasone) 10 mg tablet Take 1 tablet (10 mg) by mouth once daily.      propranolol (Inderal) 10 mg tablet Take 1 tablet (10 mg) by mouth 2 times a day as needed.      sertraline (Zoloft) 50 mg tablet TAKE 1/2 TABLET BY MOUTH = DAILY X 6 DAYS, THEN INCREASE TO 1 TABLET BY MOUTH DAILY      Vyvanse 60 mg capsule once every 24 hours.      Apri 0.15-0.03 mg tablet TAKE 1 TABLET BY MOUTH EVERY DAY (Patient not taking: Reported on 10/24/2024) 84 tablet 3    lisdexamfetamine (Vyvanse) 50 mg capsule Take by mouth once daily in the morning. Take before meals.       No current facility-administered medications for this visit.      Allergies   Allergen Reactions    Ciprofloxacin Other     tendinitis    Minocycline Hives    Penicillins Rash and Hives    Doxycycline Rash    Sulfa (Sulfonamide Antibiotics) Rash        Visit Vitals  BP (!) 147/111 (BP Location: Left arm, Patient Position: Sitting) Comment: Pt states that she has a headache. She says that she has taken her morning medications for today.   Pulse 106   Ht 1.626 m (5' 4\")   Wt 119 kg (261 lb 6.4 oz)   SpO2 98%   BMI 44.87 kg/m²   Smoking Status Never   BSA 2.32 m²      Pain Assessment 0-10 (Numeric) Pain Score: 2, Pain Location: Generalized     Rapid 3                  Physical Exam    Workup  Component      Latest Ref Rng 10/8/2024   WBC      4.4 - 11.3 x10*3/uL 9.3    nRBC      0.0 - 0.0 /100 WBCs 0.0    RBC      4.00 - 5.20 x10*6/uL 4.91    HEMOGLOBIN      12.0 - 16.0 g/dL 14.2    HEMATOCRIT      36.0 - 46.0 % 43.3    MCV      80 - 100 fL 88    MCH      26.0 - 34.0 pg 28.9    MCHC      32.0 - 36.0 g/dL " 32.8    RED CELL DISTRIBUTION WIDTH      11.5 - 14.5 % 12.4    Platelets      150 - 450 x10*3/uL 266    Neutrophils %      40.0 - 80.0 % 49.3    Immature Granulocytes %, Automated      0.0 - 0.9 % 0.5    Lymphocytes %      13.0 - 44.0 % 40.5    Monocytes %      2.0 - 10.0 % 7.8    Eosinophils %      0.0 - 6.0 % 1.7    Basophils %      0.0 - 2.0 % 0.2    Neutrophils Absolute      1.20 - 7.70 x10*3/uL 4.57    Immature Granulocytes Absolute, Automated      0.00 - 0.70 x10*3/uL 0.05    Lymphocytes Absolute      1.20 - 4.80 x10*3/uL 3.77    Monocytes Absolute      0.10 - 1.00 x10*3/uL 0.73    Eosinophils Absolute      0.00 - 0.70 x10*3/uL 0.16    Basophils Absolute      0.00 - 0.10 x10*3/uL 0.02    GLUCOSE      74 - 99 mg/dL 97    SODIUM      136 - 145 mmol/L 139    POTASSIUM      3.5 - 5.3 mmol/L 4.2    CHLORIDE      98 - 107 mmol/L 103    Bicarbonate      21 - 32 mmol/L 27    Anion Gap      10 - 20 mmol/L 13    Blood Urea Nitrogen      6 - 23 mg/dL 12    Creatinine      0.50 - 1.05 mg/dL 0.67    EGFR      >60 mL/min/1.73m*2 >90    Calcium      8.6 - 10.6 mg/dL 9.5    Albumin      3.4 - 5.0 g/dL 4.2    Alkaline Phosphatase      33 - 110 U/L 78    Total Protein      6.4 - 8.2 g/dL 6.8    AST      9 - 39 U/L 28    Bilirubin Total      0.0 - 1.2 mg/dL 0.4    ALT      7 - 45 U/L 35    Color, Urine      Light-Yellow, Yellow, Dark-Yellow  Light-Yellow    Appearance, Urine      Clear  Clear    Specific Gravity, Urine      1.005 - 1.035  1.021    pH, Urine      5.0, 5.5, 6.0, 6.5, 7.0, 7.5, 8.0  5.5    Protein, Urine      NEGATIVE, 10 (TRACE), 20 (TRACE) mg/dL NEGATIVE    Glucose, Urine      Normal mg/dL Normal    Blood, Urine      NEGATIVE  0.06 (1+) !    Ketones, Urine      NEGATIVE mg/dL NEGATIVE    Bilirubin, Urine      NEGATIVE  NEGATIVE    Urobilinogen, Urine      Normal mg/dL Normal    Nitrite, Urine      NEGATIVE  NEGATIVE    Leukocyte Esterase, Urine      NEGATIVE  NEGATIVE    WBC, Urine      1-5, NONE /HPF 1-5    RBC,  Urine      NONE, 1-2, 3-5 /HPF 3-5    Squamous Epithelial Cells, Urine      Reference range not established. /HPF 1-9 (SPARSE)    Mucus, Urine      Reference range not established. /LPF FEW    Hemoglobin A1C      See comment % 5.5    Estimated Average Glucose      Not Established mg/dL 111    C-Reactive Protein      <1.00 mg/dL 0.45    Creatine Kinase      0 - 215 U/L 124    Sed Rate      0 - 20 mm/h 5    Scan Result Vectra 27 (low)   Vitamin D, 25-Hydroxy, Total      30 - 100 ng/mL 31    Extra Tube Hold for add-ons.       Assessment/Plan  No diagnosis found.     No orders of the defined types were placed in this encounter.             Since last appt, adherent and tolerating  mg daily.   Mother dx with MS balance memory and stumbing, chronic migraines  Denies any recent or current infection.  Not on any NSAIDs or glucocorticoids.  Labs reviewed  D/w pt tx options Advised of possible side effects and importance of monitoring.   All questions answered.  Patient to follow up with primary care provider regarding all other medical issues not addressed today and for medical chart updating.        Since last appt, adherent and tolerating   EMG normal  Tilt table testing done- dx with POTS  Denies any recent or current infection.  Not on any NSAIDs or glucocorticoids.  ROS+ for   Rapid 3 consistent with   Labs reviewed  D/w pt tx options and decided on   Advised of possible side effects and importance of monitoring.   All questions answered.  Patient to follow up with primary care provider regarding all other medical issues not addressed today and for medical chart updating.         Since last appt, adherent and tolerating  Sparing   Denies any recent or current infection.  Not on any NSAIDs or glucocorticoids.  ROS+ for   Rapid 3 consistent with   Labs reviewed  D/w pt tx options and decided on   Advised of possible side effects and importance of monitoring.   All questions answered.  Patient to follow up with  primary care provider regarding all other medical issues not addressed today and for medical chart updating.   Mar Fernandez MD      Patient Care Team:  CORBY Dow as PCP - General (Family Medicine)  CORBY Dow as Primary Care Provider

## 2024-10-25 ENCOUNTER — APPOINTMENT (OUTPATIENT)
Dept: RADIOLOGY | Facility: HOSPITAL | Age: 29
End: 2024-10-25
Payer: COMMERCIAL

## 2024-10-25 ENCOUNTER — HOSPITAL ENCOUNTER (EMERGENCY)
Facility: HOSPITAL | Age: 29
Discharge: HOME | End: 2024-10-25
Payer: COMMERCIAL

## 2024-10-25 VITALS
TEMPERATURE: 98.2 F | OXYGEN SATURATION: 94 % | SYSTOLIC BLOOD PRESSURE: 172 MMHG | WEIGHT: 249 LBS | RESPIRATION RATE: 20 BRPM | HEIGHT: 64 IN | DIASTOLIC BLOOD PRESSURE: 111 MMHG | BODY MASS INDEX: 42.51 KG/M2 | HEART RATE: 106 BPM

## 2024-10-25 DIAGNOSIS — S67.194A CRUSHING INJURY OF RIGHT RING FINGER, INITIAL ENCOUNTER: Primary | ICD-10-CM

## 2024-10-25 PROCEDURE — 90471 IMMUNIZATION ADMIN: CPT

## 2024-10-25 PROCEDURE — 73140 X-RAY EXAM OF FINGER(S): CPT | Mod: RT

## 2024-10-25 PROCEDURE — 73140 X-RAY EXAM OF FINGER(S): CPT | Mod: RIGHT SIDE | Performed by: RADIOLOGY

## 2024-10-25 PROCEDURE — 2500000004 HC RX 250 GENERAL PHARMACY W/ HCPCS (ALT 636 FOR OP/ED)

## 2024-10-25 PROCEDURE — 2500000001 HC RX 250 WO HCPCS SELF ADMINISTERED DRUGS (ALT 637 FOR MEDICARE OP)

## 2024-10-25 PROCEDURE — 90715 TDAP VACCINE 7 YRS/> IM: CPT

## 2024-10-25 PROCEDURE — 99283 EMERGENCY DEPT VISIT LOW MDM: CPT | Mod: 25

## 2024-10-25 RX ORDER — OXYCODONE HYDROCHLORIDE 5 MG/1
5 TABLET ORAL EVERY 6 HOURS PRN
Qty: 15 TABLET | Refills: 0 | Status: SHIPPED | OUTPATIENT
Start: 2024-10-25 | End: 2024-11-01

## 2024-10-25 RX ORDER — ACETAMINOPHEN 325 MG/1
650 TABLET ORAL ONCE
Status: COMPLETED | OUTPATIENT
Start: 2024-10-25 | End: 2024-10-25

## 2024-10-25 RX ORDER — TRAMADOL HYDROCHLORIDE 50 MG/1
50 TABLET ORAL ONCE
Status: COMPLETED | OUTPATIENT
Start: 2024-10-25 | End: 2024-10-25

## 2024-10-25 RX ORDER — OXYCODONE HYDROCHLORIDE 5 MG/1
5 TABLET ORAL ONCE
Status: COMPLETED | OUTPATIENT
Start: 2024-10-25 | End: 2024-10-25

## 2024-10-25 ASSESSMENT — PAIN - FUNCTIONAL ASSESSMENT
PAIN_FUNCTIONAL_ASSESSMENT: 0-10
PAIN_FUNCTIONAL_ASSESSMENT: 0-10

## 2024-10-25 ASSESSMENT — PAIN SCALES - GENERAL
PAINLEVEL_OUTOF10: 6
PAINLEVEL_OUTOF10: 5 - MODERATE PAIN

## 2024-10-25 ASSESSMENT — PAIN DESCRIPTION - PAIN TYPE: TYPE: ACUTE PAIN

## 2024-10-25 ASSESSMENT — COLUMBIA-SUICIDE SEVERITY RATING SCALE - C-SSRS
2. HAVE YOU ACTUALLY HAD ANY THOUGHTS OF KILLING YOURSELF?: NO
1. IN THE PAST MONTH, HAVE YOU WISHED YOU WERE DEAD OR WISHED YOU COULD GO TO SLEEP AND NOT WAKE UP?: NO
6. HAVE YOU EVER DONE ANYTHING, STARTED TO DO ANYTHING, OR PREPARED TO DO ANYTHING TO END YOUR LIFE?: NO

## 2024-10-25 NOTE — ED PROVIDER NOTES
HPI   Chief Complaint   Patient presents with    Finger Laceration     Lacerated right ring finger on metal chair       Patient is a 29-year-old female presenting with right ring finger laceration.  She states that she got her finger caught between the wooden seat and the metal framing of the chair.  She states she is unsure when her last tetanus was.  Endorses that her finger does feel numb but also painful.  Patient still able to move her fingers without difficulty.  Patient denies fevers, chills, cough, sore throat, runny nose, chest pain, shortness of breath, abdominal pain, nausea, vomiting, diarrhea or urinary complaints.              Patient History   Past Medical History:   Diagnosis Date    Chronic post-traumatic stress disorder     Depression     Lupus     Nontoxic diffuse goiter     PCOS (polycystic ovarian syndrome)     Personal history of other mental and behavioral disorders 08/23/2017    History of anxiety disorder    Personal history of other specified conditions 08/23/2017    History of headache    PTSD (post-traumatic stress disorder)      Past Surgical History:   Procedure Laterality Date    BREAST LUMPECTOMY  06/21/2018    Breast Surgery Lumpectomy, x3    ELECTROMYOGRAPHY  10/2024    INNER EAR SURGERY  08/23/2017    Inner Ear Surgery    TONSILLECTOMY  08/23/2017    Tonsillectomy     Family History   Problem Relation Name Age of Onset    Multiple sclerosis Mother       Social History     Tobacco Use    Smoking status: Never    Smokeless tobacco: Never   Vaping Use    Vaping status: Never Used   Substance Use Topics    Alcohol use: Yes     Comment: Occasionally    Drug use: Never       Physical Exam   ED Triage Vitals   Temperature Heart Rate Respirations BP   10/25/24 1259 10/25/24 1259 10/25/24 1259 10/25/24 1301   36.8 °C (98.2 °F) (!) 106 20 (!) 172/111      Pulse Ox Temp Source Heart Rate Source Patient Position   10/25/24 1259 10/25/24 1259 10/25/24 1259 10/25/24 1259   94 % Oral Monitor  Sitting      BP Location FiO2 (%)     10/25/24 1259 --     Left arm        Physical Exam  Vitals and nursing note reviewed.   Constitutional:       Appearance: She is well-developed.      Comments: Awake, sitting in examination chair   HENT:      Head: Normocephalic and atraumatic.      Nose: Nose normal.      Mouth/Throat:      Mouth: Mucous membranes are moist.      Pharynx: Oropharynx is clear.   Eyes:      Extraocular Movements: Extraocular movements intact.      Conjunctiva/sclera: Conjunctivae normal.      Pupils: Pupils are equal, round, and reactive to light.   Cardiovascular:      Rate and Rhythm: Normal rate and regular rhythm.      Pulses: Normal pulses.      Heart sounds: Normal heart sounds. No murmur heard.  Pulmonary:      Effort: Pulmonary effort is normal. No respiratory distress.      Breath sounds: Normal breath sounds.   Abdominal:      General: Abdomen is flat.      Palpations: Abdomen is soft.      Tenderness: There is no abdominal tenderness.   Musculoskeletal:         General: No swelling. Normal range of motion.      Cervical back: Normal range of motion and neck supple.   Skin:     General: Skin is warm and dry.      Capillary Refill: Capillary refill takes less than 2 seconds.      Comments: There is an avulsion injury present to the right ring finger involving partial aspect of the nail   Neurological:      General: No focal deficit present.      Mental Status: She is alert and oriented to person, place, and time.   Psychiatric:         Mood and Affect: Mood normal.         Behavior: Behavior normal.           ED Course & MDM   Diagnoses as of 10/25/24 1935   Crushing injury of right ring finger, initial encounter                 No data recorded     [place] Coma Scale Score: 15 (10/25/24 1300 : Joyce White, EMT)                           Medical Decision Making  Patient is a 29-year-old female presenting with right ring finger laceration.  Tdap ordered.  Tramadol and Tylenol ordered.   Conditions considered include but are not limited: Laceration, contusion, fracture.    X-rays without acute findings.  Patient states that the tramadol was minimally effective.  Oxycodone written.  I believe this patient is at low risk for complication, and a disposition of discharge is acceptable.  Return to the Emergency Department if new or worsening symptoms including headache, fever, chills, chest pain, shortness of breath, syncope, near syncope, abdominal pain, nausea, vomiting,  diarrhea, or worsening pain.  Prescription for oxycodone written and encourage patient to follow-up with primary orthopedics in the next several days.  Referral given.  Bulky dressing was applied by nursing staff with pressure dressing prior to discharge.    Portions of this note made with Dragon software, please be mindful of potential grammatical errors.        Medications   traMADol (Ultram) tablet 50 mg (50 mg oral Given 10/25/24 1350)   acetaminophen (Tylenol) tablet 650 mg (650 mg oral Given 10/25/24 1350)   diphth,pertus(acell),tetanus (BoostRIX) 2.5-8-5 Lf-mcg-Lf/0.5mL vaccine 0.5 mL (0.5 mL intramuscular Given 10/25/24 1350)   oxyCODONE (Roxicodone) immediate release tablet 5 mg (5 mg oral Given 10/25/24 1601)         XR fingers right 2+ views   Final Result   No evidence for osseous abnormality. No radiopaque foreign body.        Signed by: Tanner Friedman 10/25/2024 3:10 PM   Dictation workstation:   [id number]            Procedure  Procedures     Jaime Montesinos PA-C  10/25/24 1935

## 2024-10-25 NOTE — Clinical Note
Chanel Mata was seen and treated in our emergency department on 10/25/2024.  She may return to work on 10/29/2024.       If you have any questions or concerns, please don't hesitate to call.      Jaime Montesinos PA-C

## 2024-10-25 NOTE — DISCHARGE INSTRUCTIONS
Please see Dr. Ramírez of orthopedics.    Be sure to take all medications, over the counter medications or prescription medications only as directed.    Be sure to follow up as directed in 1-2 days. All of the details of your follow up instructions are detailed in the follow up section of this packet.    If you are being discharged with any pains medications or muscle relaxers (norco, Vicodin, hydrocodone products, Percocet, oxycodone products, flexeril, cyclobenzaprine, robaxin, norflex, brand or generic, or any other pain controlling medications with the exception of Ibuprofen and regular Tylenol, do not drive or operate machinery, climb ladders or participate in any activity that could potentially put yourself or others at risk should you get dizzy, or be/feel impaired at all.    Return to emergency room without delay for ANY new or worsening pains or for any other symptoms or concerns. Return with worsening pains, nausea, vomiting, trouble breathing, palpitations, shortness of breath, inability to pass stool or urine, loss of control of stool or urine, any numbness or tingling (that is not normal for you), uncontrolled fevers, the passing of blood or other material in stool or urine, rashes, pains or for any other symptoms or concerns you may have. You are always welcome to return to the ER at any time for any reason or for any other concerns you may have.

## 2024-10-29 ENCOUNTER — LAB (OUTPATIENT)
Dept: LAB | Facility: LAB | Age: 29
End: 2024-10-29
Payer: COMMERCIAL

## 2024-10-29 DIAGNOSIS — N92.6 IRREGULAR PERIODS/MENSTRUAL CYCLES: ICD-10-CM

## 2024-10-29 DIAGNOSIS — E28.2 PCOS (POLYCYSTIC OVARIAN SYNDROME): ICD-10-CM

## 2024-10-29 LAB
DHEA-S SERPL-MCNC: 171 UG/DL (ref 12–379)
ESTRADIOL SERPL-MCNC: 181 PG/ML
FSH SERPL-ACNC: 3.1 IU/L
LH SERPL-ACNC: 7.3 IU/L
PROGEST SERPL-MCNC: 8.8 NG/ML
PROLACTIN SERPL-MCNC: 8.6 UG/L (ref 3–20)

## 2024-10-29 PROCEDURE — 82670 ASSAY OF TOTAL ESTRADIOL: CPT

## 2024-10-29 PROCEDURE — 82627 DEHYDROEPIANDROSTERONE: CPT

## 2024-10-29 PROCEDURE — 83002 ASSAY OF GONADOTROPIN (LH): CPT

## 2024-10-29 PROCEDURE — 84144 ASSAY OF PROGESTERONE: CPT

## 2024-10-29 PROCEDURE — 36415 COLL VENOUS BLD VENIPUNCTURE: CPT

## 2024-10-29 PROCEDURE — 83001 ASSAY OF GONADOTROPIN (FSH): CPT

## 2024-10-29 PROCEDURE — 84402 ASSAY OF FREE TESTOSTERONE: CPT

## 2024-10-29 PROCEDURE — 83498 ASY HYDROXYPROGESTERONE 17-D: CPT

## 2024-10-29 PROCEDURE — 84146 ASSAY OF PROLACTIN: CPT

## 2024-11-02 LAB
17OHP SERPL-MCNC: 184.84 NG/DL
TESTOSTERONE FREE (CHAN): 3.8 PG/ML (ref 0.1–6.4)
TESTOSTERONE,TOTAL,LC-MS/MS: 42 NG/DL (ref 2–45)

## 2024-11-08 DIAGNOSIS — N92.6 IRREGULAR PERIODS/MENSTRUAL CYCLES: Primary | ICD-10-CM

## 2024-11-22 ENCOUNTER — APPOINTMENT (OUTPATIENT)
Dept: OTOLARYNGOLOGY | Facility: CLINIC | Age: 29
End: 2024-11-22
Payer: COMMERCIAL

## 2024-12-19 ENCOUNTER — LAB (OUTPATIENT)
Dept: LAB | Facility: LAB | Age: 29
End: 2024-12-19
Payer: COMMERCIAL

## 2024-12-19 ENCOUNTER — APPOINTMENT (OUTPATIENT)
Dept: LAB | Facility: LAB | Age: 29
End: 2024-12-19
Payer: COMMERCIAL

## 2024-12-19 DIAGNOSIS — R00.0 TACHYCARDIA: ICD-10-CM

## 2024-12-19 LAB
GLUCOSE 1H P 75 G GLC PO SERPL-MCNC: 208 MG/DL
GLUCOSE 2H P 75 G GLC PO SERPL-MCNC: 146 MG/DL
GLUCOSE 3H P 75 G GLC PO SERPL-MCNC: 83 MG/DL
GLUCOSE P FAST SERPL-MCNC: 91 MG/DL
PROT UR-ACNC: 16 MG/DL (ref 5–25)

## 2024-12-19 PROCEDURE — 84156 ASSAY OF PROTEIN URINE: CPT

## 2024-12-19 PROCEDURE — 36415 COLL VENOUS BLD VENIPUNCTURE: CPT

## 2024-12-19 PROCEDURE — 86335 IMMUNFIX E-PHORSIS/URINE/CSF: CPT

## 2024-12-19 PROCEDURE — 83835 ASSAY OF METANEPHRINES: CPT

## 2024-12-19 PROCEDURE — 82525 ASSAY OF COPPER: CPT

## 2024-12-19 PROCEDURE — 82135 ASSAY AMINOLEVULINIC ACID: CPT

## 2024-12-19 PROCEDURE — 84166 PROTEIN E-PHORESIS/URINE/CSF: CPT

## 2024-12-19 PROCEDURE — 82951 GLUCOSE TOLERANCE TEST (GTT): CPT

## 2024-12-19 PROCEDURE — 84110 ASSAY OF PORPHOBILINOGEN: CPT

## 2024-12-19 PROCEDURE — 83918 ORGANIC ACIDS TOTAL QUANT: CPT

## 2024-12-19 PROCEDURE — 82952 GTT-ADDED SAMPLES: CPT

## 2024-12-22 LAB
COLLECT DURATION TIME SPEC: 24 HR
CREAT 24H UR-MRATE: 1656 MG/D (ref 700–1600)
CREAT UR-MCNC: 183 MG/DL
METANEPH 24H UR-MCNC: 175 UG/L
METANEPH 24H UR-MRATE: 158 UG/D (ref 36–229)
METANEPH+NORMETANEPH UR-IMP: ABNORMAL
METANEPH/CREAT 24H UR: 96 UG/G CRT (ref 0–300)
NORMETANEPHRINE 24H UR-MCNC: 354 UG/L
NORMETANEPHRINE 24H UR-MRATE: 320 UG/D (ref 95–650)
NORMETANEPHRINE/CREAT 24H UR: 193 UG/G CRT (ref 0–400)
SPECIMEN VOL ?TM UR: 905 ML

## 2024-12-23 LAB
ALBUMIN MFR UR ELPH: 26.5 %
ALPHA1 GLOB MFR UR ELPH: 9.8 %
ALPHA2 GLOB MFR UR ELPH: 18.6 %
B-GLOBULIN MFR UR ELPH: 26 %
GAMMA GLOB MFR UR ELPH: 19.1 %
IMMUNOFIXATION COMMENT: NORMAL
PATH REVIEW - URINE IMMUNOFIXATION: NORMAL
PATH REVIEW-URINE PROTEIN ELECTROPHORESIS: NORMAL
URINE ELECTROPHORESIS COMMENT: NORMAL

## 2024-12-24 LAB
COLLECT DURATION TIME SPEC: NORMAL HR
CREAT 24H UR-MRATE: NORMAL MG/D (ref 700–1600)
CREAT UR-MCNC: 176 MG/DL
CRYOGLOB SER QL: NORMAL
D-ALA 24H UR-SRATE: NORMAL UMOL/D (ref 0–60)
D-ALA UR-SCNC: 30 UMOL/L (ref 0–35)
SPECIMEN VOL ?TM UR: NORMAL ML

## 2024-12-27 LAB
COPPER 24H UR-MRATE: 13 UG/24 HR (ref 3–35)
COPPER UR-MCNC: 14 UG/L
COPPER/CREAT UR: 9 UG/G CREAT (ref 0–49)
CREAT UR-MCNC: 1.64 G/L (ref 0.3–3)
PBG 24H UR-MCNC: 1.6 MG/L (ref 0–2)
PBG 24H UR-MRATE: 1.4 MG/24 HR (ref 0–1.5)

## 2024-12-30 LAB
2OXO3ME-VALERATE/CREAT UR-SRTO: NOT DETECTED (ref 0–10)
2OXOISOCAPROATE/CREAT UR-SRTO: NOT DETECTED (ref 0–4)
2OXOISOVALERATE/CREAT UR-SRTO: NOT DETECTED (ref 0–4)
4OH-PHENYLACETATE/CREAT UR-SRTO: 16 (ref 0–25)
4OH-PHENYLLACTATE/CREAT UR-SRTO: 1 (ref 0–4)
4OH-PHENYLPYRUVATE/CREAT UR-SRTO: 1 (ref 0–2)
A-KETOGLUT/CREAT UR-SRTO: 14 (ref 0–75)
ACETOACET/CREAT UR-SRTO: NOT DETECTED (ref 0–4)
ADIPATE/CREAT UR-SRTO: 1 (ref 0–35)
B-OH-BUTYR/CREAT UR-SRTO: 1 (ref 0–4)
CREAT UR-MCNC: 170 MG/DL
ETHYLMALONATE/CREAT UR-SRTO: 2 (ref 0–4)
FUMARATE/CREAT UR-SRTO: NOT DETECTED (ref 0–4)
LACTATE/CREAT UR-SRTO: 40 (ref 0–50)
METHYLMALONATE/CREAT UR-SRTO: 1 (ref 0–5)
ORGANIC ACIDS PATTERN UR-IMP: NORMAL
PYRUVATE/CREAT UR-SRTO: 10 (ref 0–15)
SEBACATE/CREAT UR-SRTO: NOT DETECTED (ref 0–3)
SUBERATE/CREAT UR-SRTO: 1 (ref 0–3)
SUCCINATE/CREAT UR-SRTO: 6 (ref 0–20)
SUCCINYLACETONE/CREAT UR-SRTO: NOT DETECTED (ref 0–0)

## 2024-12-31 ENCOUNTER — PATIENT MESSAGE (OUTPATIENT)
Dept: NEUROLOGY | Facility: HOSPITAL | Age: 29
End: 2024-12-31
Payer: COMMERCIAL

## 2025-01-02 ENCOUNTER — TELEMEDICINE (OUTPATIENT)
Dept: NEUROLOGY | Facility: HOSPITAL | Age: 30
End: 2025-01-02
Payer: COMMERCIAL

## 2025-01-02 DIAGNOSIS — G90.A POTS (POSTURAL ORTHOSTATIC TACHYCARDIA SYNDROME): ICD-10-CM

## 2025-01-02 PROCEDURE — 99215 OFFICE O/P EST HI 40 MIN: CPT | Mod: 95 | Performed by: PSYCHIATRY & NEUROLOGY

## 2025-01-02 PROCEDURE — 99215 OFFICE O/P EST HI 40 MIN: CPT | Performed by: PSYCHIATRY & NEUROLOGY

## 2025-01-02 NOTE — PROGRESS NOTES
[facility] AUTONOMIC PROGRAM    AUTONOMIC FOLLOW-UP VISIT      Isreal Turpin MD  Professor of Neurology  [facility]  Senior Attending Physician- [facility]  Director, Autonomic Program  Medical Director, [place] [address]  Office: [phone]  Assistant: Tamika Deleon (email: [email or web address])     Patient Information     Medical Record Number: [id number]   YOB: 1995    Home Address: [address] [place] [address]   Phone Number:  [phone]      Primary Care Physician: Monica Ovalles, APRN-CNP    Referring Physician: No referring provider defined for this encounter.    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 Main cause is probably Sjögren's. Other additional factors contributing: mild B12 deficiency ; Mild IGT.    Status of the 5 conservatives:    Following appropriately, except for stockings which were denied by her insurance.       Status of the symptoms:    Slightly improved with conservatives.     Findings on testing:     H adren POTS   Normal B12 (low in previous years); normal RASHI (high in previous years); mild IGT  Early Sjögren's elevated Sp-1 IgG and IgM      PLAN/RECOMMENDATIONS:     H adren POTS: increase propra to 20 mg a day  Mild IGT: weight loss and exercise  B12 was low now nl: take 5000 s/l  Early Sjö elevated; Get lip and skin bx  Reapply for comp stocks with POTS dg, as they were was denied.   Touch base after biopsies resulted and consider IVIG.    HPI    Chanel Mata is a 29 y.o.  female presenting to the  Autonomic Program for a follow-up on autonomic work-up. This is follow-up visit 1      Interval History details     She is on hydroxychloroquine fro  presumed SLE. Slight improved symptoms. Following 5 conservatives except for stockings which were denied by insurance.     Initial History    Orthostatic lightheadedness since childhood, rapid HR at rest, hypo/hyperhydrosis, 1 episode of neurogenic bladder lasted 1 month 2 years ago and diagnosis of SLE.      I spent 40 minutes with the patient, at least 50% of which were spent on treatment management and education.    Isreal Turpin MD

## 2025-01-13 ENCOUNTER — PROCEDURE VISIT (OUTPATIENT)
Dept: OTOLARYNGOLOGY | Facility: CLINIC | Age: 30
End: 2025-01-13
Payer: COMMERCIAL

## 2025-01-13 VITALS — WEIGHT: 249.5 LBS | BODY MASS INDEX: 42.59 KG/M2 | HEIGHT: 64 IN

## 2025-01-13 DIAGNOSIS — R68.2 DRY MOUTH: ICD-10-CM

## 2025-01-13 PROCEDURE — 99203 OFFICE O/P NEW LOW 30 MIN: CPT | Performed by: STUDENT IN AN ORGANIZED HEALTH CARE EDUCATION/TRAINING PROGRAM

## 2025-01-13 PROCEDURE — 40490 BIOPSY OF LIP: CPT | Mod: 25 | Performed by: STUDENT IN AN ORGANIZED HEALTH CARE EDUCATION/TRAINING PROGRAM

## 2025-01-13 PROCEDURE — 40490 BIOPSY OF LIP: CPT | Performed by: STUDENT IN AN ORGANIZED HEALTH CARE EDUCATION/TRAINING PROGRAM

## 2025-01-13 PROCEDURE — 99213 OFFICE O/P EST LOW 20 MIN: CPT | Mod: 25 | Performed by: STUDENT IN AN ORGANIZED HEALTH CARE EDUCATION/TRAINING PROGRAM

## 2025-01-13 ASSESSMENT — PAIN SCALES - GENERAL: PAINLEVEL_OUTOF10: 0-NO PAIN

## 2025-01-13 ASSESSMENT — PATIENT HEALTH QUESTIONNAIRE - PHQ9
1. LITTLE INTEREST OR PLEASURE IN DOING THINGS: NOT AT ALL
SUM OF ALL RESPONSES TO PHQ9 QUESTIONS 1 AND 2: 0
2. FEELING DOWN, DEPRESSED OR HOPELESS: NOT AT ALL

## 2025-01-13 NOTE — PROGRESS NOTES
HEAD AND NECK SURGERY CONSULT  [facility]    Referring Provider: Johanne ROACH  I had the pleasure of seeing Chanel Mata as a consultation today for lip biopsy for evaluation of Sjogren's. Patient has been experiencing dry mouth and eyes. She has a history of SLE on hydroxychloroquine.      The patient has further history of urinary retention, connective tissue disease, RA.    The patient denies having prior trauma or surgery to their head and neck. There is not a personal or family history of blood clots, easy bleeding or bruising, or anesthesia concerns. Works in [employer] as a [employer].    Tobacco use: The patient  reports that she has never smoked. She has never used smokeless tobacco.   Alcohol Use: The patient  reports current alcohol use.     Physical Examination  Vitals:  There were no vitals taken for this visit.  General: Examination reveals a well-developed, well-nourished patient in no apparent distress.  The patient has no audible dysphonia, stridor or airway distress.  The patient is oriented, alert and responsive.    Ears: Bilateral EAC patent, TM visualized and intact, no middle ear effusion  Face: no lesions of the face, symmetric movement  Oral Cavity:  The patient is able to open the mouth widely without trismus.  The floor of mouth and oral tongue are soft, and no mucosal abnormalities are noted on the lips or within the oral cavity.  The oral tongue is fully mobile and midline on protrusion.  The patient's teeth are good  Oropharynx: There are no mucosal abnormalities noted within the oropharynx.  The soft palate elevates symmetrically, the tonsils and base of tongue are normal to inspection and palpation.       Salivary glands: There are no palpable masses of the parotid or submandibular glands.   Neuro: Cranial nerves 2-12 are without obvious abnormality.  Neck: The neck is soft and symmetric.  There is no palpable adenopathy.     DATA REVIEWED:  Labs:  Lab Results    Component Value Date    WBC 9.3 10/08/2024    HGB 14.2 10/08/2024    HCT 43.3 10/08/2024     10/08/2024    NEUTROABS 4.57 10/08/2024     Lab Results   Component Value Date    TSH 1.93 08/10/2024    HGBA1C 5.5 10/08/2024    DQXQYUKQ12 416 08/10/2024    RPR NONREACTIVE 10/11/2018        Review of prior medical records: I reviewed the patient's medical records which included a clinic note from Dr. Isreal Whiting dated 8/8/2024 in which he detailed dry eye/mouth symptoms and autoimmune hx.     Pathology: none     Biopsy of minor salivary gland 53733  Indications: work up for sjogrens syndrome, dry eye and dry mouth  Consent: Verbal consent obtained from patient prior to proceeding  Anesthesia: Topical anesthesia with 1;100,000 lidocaine with epinephrine  Procedure: After adequate time for topical anesthesia, an 11 blade was used to make a small stab incision into the right-side lip mucosa. Salivary glands were expressed and grasped with fine tweezers. These were resected sent for pathologic analysis. Hemostasis was achieved with pressure. 5.0 fast suture was placed to re-approximate the mucosa. Patient tolerated the procedure well with no immediate complications.    ASSESSMENT and PLAN:    Lip biopsy to assess for Sjogrens syndrome performed as described above  - Wound care discussed at length  - Follow up biopsy results with Dr. Whiting  - Follow up as needed    Yamila Martinez MD

## 2025-01-17 LAB
LABORATORY COMMENT REPORT: NORMAL
PATH REPORT.FINAL DX SPEC: NORMAL
PATH REPORT.GROSS SPEC: NORMAL
PATH REPORT.RELEVANT HX SPEC: NORMAL
PATH REPORT.TOTAL CANCER: NORMAL

## 2025-01-23 ENCOUNTER — APPOINTMENT (OUTPATIENT)
Dept: NEUROLOGY | Facility: HOSPITAL | Age: 30
End: 2025-01-23
Payer: COMMERCIAL

## 2025-02-04 ENCOUNTER — HOSPITAL ENCOUNTER (OUTPATIENT)
Dept: RADIOLOGY | Facility: HOSPITAL | Age: 30
Discharge: HOME | End: 2025-02-04
Payer: COMMERCIAL

## 2025-02-04 DIAGNOSIS — N92.6 IRREGULAR PERIODS/MENSTRUAL CYCLES: ICD-10-CM

## 2025-02-04 PROCEDURE — 76830 TRANSVAGINAL US NON-OB: CPT | Performed by: RADIOLOGY

## 2025-02-04 PROCEDURE — 76856 US EXAM PELVIC COMPLETE: CPT

## 2025-02-04 PROCEDURE — 76856 US EXAM PELVIC COMPLETE: CPT | Performed by: RADIOLOGY

## 2025-03-23 DIAGNOSIS — M06.09 POLYARTHRITIS WITH NEGATIVE RHEUMATOID FACTOR (MULTI): ICD-10-CM

## 2025-03-24 RX ORDER — HYDROXYCHLOROQUINE SULFATE 200 MG/1
TABLET, FILM COATED ORAL 2 TIMES DAILY
Qty: 60 TABLET | Refills: 11 | Status: SHIPPED | OUTPATIENT
Start: 2025-03-24

## 2025-03-31 ENCOUNTER — APPOINTMENT (OUTPATIENT)
Dept: OTOLARYNGOLOGY | Facility: CLINIC | Age: 30
End: 2025-03-31
Payer: COMMERCIAL

## 2025-04-01 ENCOUNTER — TELEPHONE (OUTPATIENT)
Dept: PRIMARY CARE | Facility: CLINIC | Age: 30
End: 2025-04-01
Payer: COMMERCIAL

## 2025-04-09 ASSESSMENT — PROMIS GLOBAL HEALTH SCALE
EMOTIONAL_PROBLEMS: SOMETIMES
RATE_PHYSICAL_HEALTH: FAIR
CARRYOUT_SOCIAL_ACTIVITIES: FAIR
RATE_MENTAL_HEALTH: FAIR
RATE_GENERAL_HEALTH: FAIR
CARRYOUT_PHYSICAL_ACTIVITIES: A LITTLE
RATE_AVERAGE_PAIN: 4
RATE_AVERAGE_FATIGUE: SEVERE
RATE_QUALITY_OF_LIFE: FAIR
RATE_SOCIAL_SATISFACTION: FAIR

## 2025-04-15 PROBLEM — Z72.51 HIGH RISK HETEROSEXUAL BEHAVIOR: Status: RESOLVED | Noted: 2023-06-05 | Resolved: 2025-04-15

## 2025-04-15 PROBLEM — Z82.49 FAMILY HISTORY OF CHRONIC ISCHEMIC HEART DISEASE: Status: ACTIVE | Noted: 2025-04-15

## 2025-04-15 PROBLEM — G90.A POSTURAL ORTHOSTATIC TACHYCARDIA SYNDROME (POTS): Status: ACTIVE | Noted: 2022-07-01

## 2025-04-16 ENCOUNTER — APPOINTMENT (OUTPATIENT)
Dept: PRIMARY CARE | Facility: CLINIC | Age: 30
End: 2025-04-16
Payer: COMMERCIAL

## 2025-04-16 VITALS
HEART RATE: 94 BPM | OXYGEN SATURATION: 98 % | DIASTOLIC BLOOD PRESSURE: 80 MMHG | BODY MASS INDEX: 40.29 KG/M2 | HEIGHT: 64 IN | WEIGHT: 236 LBS | SYSTOLIC BLOOD PRESSURE: 140 MMHG

## 2025-04-16 DIAGNOSIS — Z00.00 ANNUAL PHYSICAL EXAM: Primary | ICD-10-CM

## 2025-04-16 DIAGNOSIS — M32.8 OTHER FORMS OF SYSTEMIC LUPUS ERYTHEMATOSUS, UNSPECIFIED ORGAN INVOLVEMENT STATUS (MULTI): ICD-10-CM

## 2025-04-16 DIAGNOSIS — G90.A POSTURAL ORTHOSTATIC TACHYCARDIA SYNDROME (POTS): ICD-10-CM

## 2025-04-16 DIAGNOSIS — E66.813 CLASS 3 SEVERE OBESITY DUE TO EXCESS CALORIES WITHOUT SERIOUS COMORBIDITY WITH BODY MASS INDEX (BMI) OF 40.0 TO 44.9 IN ADULT: ICD-10-CM

## 2025-04-16 DIAGNOSIS — Z30.09 BIRTH CONTROL COUNSELING: ICD-10-CM

## 2025-04-16 DIAGNOSIS — E66.01 CLASS 3 SEVERE OBESITY DUE TO EXCESS CALORIES WITHOUT SERIOUS COMORBIDITY WITH BODY MASS INDEX (BMI) OF 40.0 TO 44.9 IN ADULT: ICD-10-CM

## 2025-04-16 LAB
POC APPEARANCE, URINE: CLEAR
POC BILIRUBIN, URINE: NEGATIVE
POC BLOOD, URINE: NEGATIVE
POC COLOR, URINE: YELLOW
POC GLUCOSE, URINE: NEGATIVE MG/DL
POC KETONES, URINE: ABNORMAL MG/DL
POC LEUKOCYTES, URINE: NEGATIVE
POC NITRITE,URINE: NEGATIVE
POC PH, URINE: 7 PH
POC PROTEIN, URINE: ABNORMAL MG/DL
POC SPECIFIC GRAVITY, URINE: >=1.03
POC UROBILINOGEN, URINE: 1 EU/DL

## 2025-04-16 PROCEDURE — 3008F BODY MASS INDEX DOCD: CPT

## 2025-04-16 PROCEDURE — 1036F TOBACCO NON-USER: CPT

## 2025-04-16 PROCEDURE — 99395 PREV VISIT EST AGE 18-39: CPT

## 2025-04-16 PROCEDURE — 81003 URINALYSIS AUTO W/O SCOPE: CPT

## 2025-04-16 RX ORDER — LEVONORGESTREL/ETHIN.ESTRADIOL 0.1-0.02MG
1 TABLET ORAL DAILY
Qty: 28 TABLET | Refills: 12 | Status: SHIPPED | OUTPATIENT
Start: 2025-04-16 | End: 2026-04-16

## 2025-04-16 RX ORDER — SEMAGLUTIDE 1 MG/0.2ML
1 SYRINGE (ML) SUBCUTANEOUS WEEKLY
COMMUNITY

## 2025-04-16 ASSESSMENT — ENCOUNTER SYMPTOMS
FREQUENCY: 0
DIARRHEA: 0
ABDOMINAL PAIN: 0
UNEXPECTED WEIGHT CHANGE: 0
DIFFICULTY URINATING: 0
VOMITING: 0
PALPITATIONS: 0
CONSTIPATION: 0
NAUSEA: 0
SHORTNESS OF BREATH: 0
FEVER: 0
CHILLS: 0
COUGH: 0

## 2025-04-16 ASSESSMENT — LIFESTYLE VARIABLES
HOW OFTEN DO YOU HAVE SIX OR MORE DRINKS ON ONE OCCASION: NEVER
SKIP TO QUESTIONS 9-10: 1
HOW OFTEN DO YOU HAVE A DRINK CONTAINING ALCOHOL: NEVER
HOW MANY STANDARD DRINKS CONTAINING ALCOHOL DO YOU HAVE ON A TYPICAL DAY: PATIENT DOES NOT DRINK
AUDIT-C TOTAL SCORE: 0

## 2025-04-16 ASSESSMENT — COLUMBIA-SUICIDE SEVERITY RATING SCALE - C-SSRS: 1. IN THE PAST MONTH, HAVE YOU WISHED YOU WERE DEAD OR WISHED YOU COULD GO TO SLEEP AND NOT WAKE UP?: NO

## 2025-04-16 ASSESSMENT — PATIENT HEALTH QUESTIONNAIRE - PHQ9
SUM OF ALL RESPONSES TO PHQ9 QUESTIONS 1 AND 2: 0
1. LITTLE INTEREST OR PLEASURE IN DOING THINGS: NOT AT ALL
2. FEELING DOWN, DEPRESSED OR HOPELESS: NOT AT ALL

## 2025-04-16 ASSESSMENT — PAIN SCALES - GENERAL: PAINLEVEL_OUTOF10: 0-NO PAIN

## 2025-04-16 NOTE — PROGRESS NOTES
"Subjective   Patient ID: Chanel Mata is a 29 y.o. female who presents for Physical (-patient would like to possibly get on a different type of birth control that regulates her period . The last birth control stopped her cycle . )    Specific concerns today: apri stopped period  Diet and exercise: pretty good, exercise three times a week   Sleep: More sleep, generally tired   Stress: fine   Occupation: , works for the [employer]   Dental: last week  Hearing: no concerns  Vision: wears glasses has appt next week bc she takes hydroxychloroquine   LMP: BC stopped it, 4/12/25 regular   Sexual Activity: no     Patient Care Team:  Stephanie Felder PA-C as PCP - General (Internal Medicine)  CORYB Dow as Primary Care Provider    Chanel Mata is seen for comprehensive physical exam.  PMH, PSH, family history and social history were reviewed and updated.    Review of Systems   Constitutional:  Negative for chills, fever and unexpected weight change.   HENT:  Negative for congestion and hearing loss.    Eyes:  Negative for visual disturbance.   Respiratory:  Negative for cough and shortness of breath.    Cardiovascular:  Negative for chest pain, palpitations and leg swelling.   Gastrointestinal:  Negative for abdominal pain, constipation, diarrhea, nausea and vomiting.   Genitourinary:  Negative for difficulty urinating, frequency, menstrual problem and urgency.   All other systems reviewed and are negative.      Objective   /80   Pulse 94   Ht 1.626 m (5' 4\")   Wt 107 kg (236 lb)   LMP 04/12/2025 (Exact Date)   SpO2 98%   BMI 40.51 kg/m²     Physical Exam  Vitals and nursing note reviewed.   Constitutional:       General: She is not in acute distress.     Appearance: Normal appearance. She is not ill-appearing.   Eyes:      Extraocular Movements: Extraocular movements intact.      Conjunctiva/sclera: Conjunctivae normal.   Cardiovascular:      Rate and Rhythm: Normal rate and regular " rhythm.      Pulses: Normal pulses.      Heart sounds: Normal heart sounds. No murmur heard.     No friction rub. No gallop.   Pulmonary:      Effort: Pulmonary effort is normal. No respiratory distress.      Breath sounds: Normal breath sounds. No stridor. No wheezing, rhonchi or rales.   Chest:      Chest wall: No tenderness.   Abdominal:      General: Abdomen is flat. There is no distension.      Palpations: Abdomen is soft. There is no mass.      Tenderness: There is no abdominal tenderness.      Hernia: No hernia is present.   Musculoskeletal:      Cervical back: Normal range of motion and neck supple.   Skin:     General: Skin is warm and dry.      Coloration: Skin is not jaundiced.   Neurological:      Mental Status: She is alert and oriented to person, place, and time.   Psychiatric:         Mood and Affect: Mood normal.         Behavior: Behavior normal.       Assessment/Plan   Assessment & Plan  Annual physical exam    Orders:    POCT UA Automated manually resulted    Lifestyle discussed  Preventatives discussed   No lab work necessary as she has gotten a full panel in the last 6 months.   Follow up in 1 year for CPE  Birth control counseling    Orders:    levonorgestreL-ethinyl estrad (Aviane, Alesse, Lessina) 0.1-20 mg-mcg tablet; Take 1 tablet by mouth once daily.    Trying a new kind that is a little bit lower dose to see if this allows her to maintain her period.     Patient will follow up if any side effects or if her period becomes irregular.     Class 3 severe obesity due to excess calories without serious comorbidity with body mass index (BMI) of 40.0 to 44.9 in adult    Taking compounded semaglutide from [employer]   Lost 25 pounds   Currently having no side effects     Postural orthostatic tachycardia syndrome (POTS)  Sees neuro   Takes propranolol  No problems  Other forms of systemic lupus erythematosus, unspecified organ involvement status (Multi)  Sees rheum  Takes hydroxychloroquine --  seeing optho next week for an examination  No concerns at this time.     Follow up 1 Year for next CPE, sooner with any problems or concerns.

## 2025-04-16 NOTE — ASSESSMENT & PLAN NOTE
Taking compounded semaglutide from [employer]   Lost 25 pounds   Currently having no side effects     
Sees neuro   Takes propranolol  No problems  
Sees rheum  Takes hydroxychloroquine -- seeing optho next week for an examination  No concerns at this time.   
97.9

## 2025-04-24 ENCOUNTER — APPOINTMENT (OUTPATIENT)
Dept: RHEUMATOLOGY | Facility: CLINIC | Age: 30
End: 2025-04-24
Payer: COMMERCIAL

## 2025-05-02 ENCOUNTER — TRANSCRIBE ORDERS (OUTPATIENT)
Dept: RHEUMATOLOGY | Facility: CLINIC | Age: 30
End: 2025-05-02
Payer: COMMERCIAL

## 2025-05-02 DIAGNOSIS — M06.09 POLYARTHRITIS WITH NEGATIVE RHEUMATOID FACTOR (MULTI): ICD-10-CM

## 2025-05-02 DIAGNOSIS — E55.9 VITAMIN D DEFICIENCY: ICD-10-CM

## 2025-05-03 LAB
25(OH)D3+25(OH)D2 SERPL-MCNC: 31 NG/ML (ref 30–100)
ALBUMIN SERPL-MCNC: 4.3 G/DL (ref 3.6–5.1)
ALP SERPL-CCNC: 56 U/L (ref 31–125)
ALT SERPL-CCNC: 19 U/L (ref 6–29)
ANION GAP SERPL CALCULATED.4IONS-SCNC: 7 MMOL/L (CALC) (ref 7–17)
APPEARANCE UR: ABNORMAL
AST SERPL-CCNC: 18 U/L (ref 10–30)
BACTERIA #/AREA URNS HPF: ABNORMAL /HPF
BACTERIA UR CULT: ABNORMAL
BASOPHILS # BLD AUTO: 23 CELLS/UL (ref 0–200)
BASOPHILS NFR BLD AUTO: 0.3 %
BILIRUB SERPL-MCNC: 0.5 MG/DL (ref 0.2–1.2)
BILIRUB UR QL STRIP: NEGATIVE
BUN SERPL-MCNC: 15 MG/DL (ref 7–25)
CALCIUM SERPL-MCNC: 9.3 MG/DL (ref 8.6–10.2)
CAOX CRY #/AREA URNS HPF: ABNORMAL /HPF
CHLORIDE SERPL-SCNC: 105 MMOL/L (ref 98–110)
CO2 SERPL-SCNC: 26 MMOL/L (ref 20–32)
COLOR UR: YELLOW
CREAT SERPL-MCNC: 0.71 MG/DL (ref 0.5–0.97)
CRP SERPL-MCNC: NORMAL MG/L
EGFRCR SERPLBLD CKD-EPI 2021: 117 ML/MIN/1.73M2
EOSINOPHIL # BLD AUTO: 129 CELLS/UL (ref 15–500)
EOSINOPHIL NFR BLD AUTO: 1.7 %
ERYTHROCYTE [DISTWIDTH] IN BLOOD BY AUTOMATED COUNT: 13.3 % (ref 11–15)
ERYTHROCYTE [SEDIMENTATION RATE] IN BLOOD BY WESTERGREN METHOD: 6 MM/H
GLUCOSE SERPL-MCNC: 97 MG/DL (ref 65–99)
GLUCOSE UR QL STRIP: NEGATIVE
HCT VFR BLD AUTO: 41.1 % (ref 35–45)
HGB BLD-MCNC: 13.7 G/DL (ref 11.7–15.5)
HGB UR QL STRIP: NEGATIVE
HYALINE CASTS #/AREA URNS LPF: ABNORMAL /LPF
KETONES UR QL STRIP: NEGATIVE
LEUKOCYTE ESTERASE UR QL STRIP: NEGATIVE
LYMPHOCYTES # BLD AUTO: 3298 CELLS/UL (ref 850–3900)
LYMPHOCYTES NFR BLD AUTO: 43.4 %
MCH RBC QN AUTO: 29 PG (ref 27–33)
MCHC RBC AUTO-ENTMCNC: 33.3 G/DL (ref 32–36)
MCV RBC AUTO: 86.9 FL (ref 80–100)
MONOCYTES # BLD AUTO: 547 CELLS/UL (ref 200–950)
MONOCYTES NFR BLD AUTO: 7.2 %
NEUTROPHILS # BLD AUTO: 3602 CELLS/UL (ref 1500–7800)
NEUTROPHILS NFR BLD AUTO: 47.4 %
NITRITE UR QL STRIP: NEGATIVE
PH UR STRIP: 6 [PH] (ref 5–8)
PLATELET # BLD AUTO: 262 THOUSAND/UL (ref 140–400)
PMV BLD REES-ECKER: 11.3 FL (ref 7.5–12.5)
POTASSIUM SERPL-SCNC: 4.4 MMOL/L (ref 3.5–5.3)
PROT SERPL-MCNC: 6.7 G/DL (ref 6.1–8.1)
PROT UR QL STRIP: ABNORMAL
RBC # BLD AUTO: 4.73 MILLION/UL (ref 3.8–5.1)
RBC #/AREA URNS HPF: ABNORMAL /HPF
SERVICE CMNT-IMP: ABNORMAL
SODIUM SERPL-SCNC: 138 MMOL/L (ref 135–146)
SP GR UR STRIP: 1.03 (ref 1–1.03)
SQUAMOUS #/AREA URNS HPF: ABNORMAL /HPF
WBC # BLD AUTO: 7.6 THOUSAND/UL (ref 3.8–10.8)
WBC #/AREA URNS HPF: ABNORMAL /HPF

## 2025-05-05 LAB
25(OH)D3+25(OH)D2 SERPL-MCNC: 31 NG/ML (ref 30–100)
ALBUMIN SERPL-MCNC: 4.3 G/DL (ref 3.6–5.1)
ALP SERPL-CCNC: 56 U/L (ref 31–125)
ALT SERPL-CCNC: 19 U/L (ref 6–29)
ANION GAP SERPL CALCULATED.4IONS-SCNC: 7 MMOL/L (CALC) (ref 7–17)
APPEARANCE UR: ABNORMAL
AST SERPL-CCNC: 18 U/L (ref 10–30)
BACTERIA #/AREA URNS HPF: ABNORMAL /HPF
BACTERIA UR CULT: ABNORMAL
BASOPHILS # BLD AUTO: 23 CELLS/UL (ref 0–200)
BASOPHILS NFR BLD AUTO: 0.3 %
BILIRUB SERPL-MCNC: 0.5 MG/DL (ref 0.2–1.2)
BILIRUB UR QL STRIP: NEGATIVE
BUN SERPL-MCNC: 15 MG/DL (ref 7–25)
CALCIUM SERPL-MCNC: 9.3 MG/DL (ref 8.6–10.2)
CAOX CRY #/AREA URNS HPF: ABNORMAL /HPF
CHLORIDE SERPL-SCNC: 105 MMOL/L (ref 98–110)
CO2 SERPL-SCNC: 26 MMOL/L (ref 20–32)
COLOR UR: YELLOW
CREAT SERPL-MCNC: 0.71 MG/DL (ref 0.5–0.97)
CRP SERPL-MCNC: 4.3 MG/L
EGFRCR SERPLBLD CKD-EPI 2021: 117 ML/MIN/1.73M2
EOSINOPHIL # BLD AUTO: 129 CELLS/UL (ref 15–500)
EOSINOPHIL NFR BLD AUTO: 1.7 %
ERYTHROCYTE [DISTWIDTH] IN BLOOD BY AUTOMATED COUNT: 13.3 % (ref 11–15)
ERYTHROCYTE [SEDIMENTATION RATE] IN BLOOD BY WESTERGREN METHOD: 6 MM/H
GLUCOSE SERPL-MCNC: 97 MG/DL (ref 65–99)
GLUCOSE UR QL STRIP: NEGATIVE
HCT VFR BLD AUTO: 41.1 % (ref 35–45)
HGB BLD-MCNC: 13.7 G/DL (ref 11.7–15.5)
HGB UR QL STRIP: NEGATIVE
HYALINE CASTS #/AREA URNS LPF: ABNORMAL /LPF
KETONES UR QL STRIP: NEGATIVE
LEUKOCYTE ESTERASE UR QL STRIP: NEGATIVE
LYMPHOCYTES # BLD AUTO: 3298 CELLS/UL (ref 850–3900)
LYMPHOCYTES NFR BLD AUTO: 43.4 %
MCH RBC QN AUTO: 29 PG (ref 27–33)
MCHC RBC AUTO-ENTMCNC: 33.3 G/DL (ref 32–36)
MCV RBC AUTO: 86.9 FL (ref 80–100)
MONOCYTES # BLD AUTO: 547 CELLS/UL (ref 200–950)
MONOCYTES NFR BLD AUTO: 7.2 %
NEUTROPHILS # BLD AUTO: 3602 CELLS/UL (ref 1500–7800)
NEUTROPHILS NFR BLD AUTO: 47.4 %
NITRITE UR QL STRIP: NEGATIVE
PH UR STRIP: 6 [PH] (ref 5–8)
PLATELET # BLD AUTO: 262 THOUSAND/UL (ref 140–400)
PMV BLD REES-ECKER: 11.3 FL (ref 7.5–12.5)
POTASSIUM SERPL-SCNC: 4.4 MMOL/L (ref 3.5–5.3)
PROT SERPL-MCNC: 6.7 G/DL (ref 6.1–8.1)
PROT UR QL STRIP: ABNORMAL
RBC # BLD AUTO: 4.73 MILLION/UL (ref 3.8–5.1)
RBC #/AREA URNS HPF: ABNORMAL /HPF
SERVICE CMNT-IMP: ABNORMAL
SODIUM SERPL-SCNC: 138 MMOL/L (ref 135–146)
SP GR UR STRIP: 1.03 (ref 1–1.03)
SQUAMOUS #/AREA URNS HPF: ABNORMAL /HPF
WBC # BLD AUTO: 7.6 THOUSAND/UL (ref 3.8–10.8)
WBC #/AREA URNS HPF: ABNORMAL /HPF

## 2025-05-16 ENCOUNTER — APPOINTMENT (OUTPATIENT)
Dept: RHEUMATOLOGY | Facility: CLINIC | Age: 30
End: 2025-05-16
Payer: COMMERCIAL

## 2025-05-20 ENCOUNTER — APPOINTMENT (OUTPATIENT)
Dept: SURGICAL ONCOLOGY | Facility: CLINIC | Age: 30
End: 2025-05-20
Payer: COMMERCIAL

## 2025-05-20 ENCOUNTER — APPOINTMENT (OUTPATIENT)
Dept: RADIOLOGY | Facility: CLINIC | Age: 30
End: 2025-05-20
Payer: COMMERCIAL

## 2025-06-04 ENCOUNTER — HOSPITAL ENCOUNTER (OUTPATIENT)
Dept: RADIOLOGY | Facility: HOSPITAL | Age: 30
Discharge: HOME | End: 2025-06-04
Payer: COMMERCIAL

## 2025-06-04 DIAGNOSIS — D24.2 FIBROADENOMA OF BOTH BREASTS: ICD-10-CM

## 2025-06-04 DIAGNOSIS — D24.1 FIBROADENOMA OF BOTH BREASTS: ICD-10-CM

## 2025-06-04 PROCEDURE — 76642 ULTRASOUND BREAST LIMITED: CPT | Mod: 50

## 2025-06-04 PROCEDURE — 76983 USE EA ADDL TARGET LESION: CPT

## 2025-06-06 ASSESSMENT — ENCOUNTER SYMPTOMS
CARDIOVASCULAR NEGATIVE: 1
HEMATOLOGIC/LYMPHATIC NEGATIVE: 1
ENDOCRINE NEGATIVE: 1
MUSCULOSKELETAL NEGATIVE: 1
NEUROLOGICAL NEGATIVE: 1
EYES NEGATIVE: 1
CONSTITUTIONAL NEGATIVE: 1
GASTROINTESTINAL NEGATIVE: 1
PSYCHIATRIC NEGATIVE: 1
RESPIRATORY NEGATIVE: 1

## 2025-06-06 NOTE — PROGRESS NOTES
Chanel Mata female   1995 29 y.o.   [id number]      Chief Complaint    Follow-up            HPI  Chanel Mata is a 29 y.o.  [facility] the [facility] for breast fibroadenomas. 2023 she was seen in the Emergency Department visit for lupus flare up and CT was performed noting incidental breast masses which prompted breast imaging. 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 She has intermittent breast dull aching pain. She notes right nipple skin cyst/pore with occasional white discharge. She has family history of breast cancer in paternal aunt in her late 40s, recurrence in her 60s.    She is planning a trip to [facility] in the fall.     BREAST IMAGIN2023 bilateral diagnostic mammogram at [facility], BI-RADS Category 0, bilateral breast masses.  2023 bilateral breast ultrasound at [facility], BI-RADS Category 3, right breast 9:00 mid depth 1.4 x 1.4 x 0.8cm probable benign fibroadenoma, left breast 9:00 mid depth 1.9 x 1.2 x 0.5cm probable benign fibroadenoma, follow up bilateral ultrasound is recommended.  2023 right breast ultrasound, BI-RADS Category 3, stable probable benign breast masses.  2024 bilateral breast ultrasound, BI-RADS Category 3, stable probable benign breast masses  2025 bilateral breast ultrasound, BI-RADS Category 2, stable bilateral probable benign breast masses    REPRODUCTIVE HISTORY: menarche age 6m nullipara, premenopausal with PCOS, current OCPs, LMP 2023, 3 right benign excisions for juvenile fibroadenomas    FAMILY CANCER HISTORY  Paternal aunt: breast cancer in her late 40s, recurrence in her 60s.  Father: HSV related throat cancer    REVIEW OF SYSTEMS  Review of Systems   Constitutional: Negative.    HENT:  Negative.     Eyes: Negative.    Respiratory: Negative.     Cardiovascular: Negative.    Gastrointestinal: Negative.     Endocrine: Negative.    Genitourinary: Negative.     Musculoskeletal: Negative.    Skin: Negative.    Neurological: Negative.    Hematological: Negative.    Psychiatric/Behavioral: Negative.           MEDICATIONS  Current Outpatient Medications   Medication Instructions    cholecalciferol (VITAMIN D-3) 50 mcg, Daily    hydroxychloroquine (Plaquenil) 200 mg tablet oral, 2 times daily    hydrOXYzine HCL (ATARAX) 25 mg, As needed    levonorgestreL-ethinyl estrad (Aviane, Alesse, Lessina) 0.1-20 mg-mcg tablet 1 tablet, oral, Daily    omeprazole (PriLOSEC) 40 mg DR capsule TAKE 1 CAPSULE BY MOUTH EVERY DAY 30 MINUTES BEFORE MORNING MEAL FOR 30 DAYS    prazosin (Minipress) 1 mg capsule Every 24 hours    predniSONE (DELTASONE) 10 mg, oral, Daily    propranolol (INDERAL) 10 mg, 2 times daily PRN    semaglutide 1 mg/0.2 mL syringe 1 mg combined, Weekly    sertraline (Zoloft) 50 mg tablet TAKE 1/2 TABLET BY MOUTH = DAILY X 6 DAYS, THEN INCREASE TO 1 TABLET BY MOUTH DAILY    Vyvanse 60 mg capsule Every 24 hours        ALLERGIES  Allergies   Allergen Reactions    Ciprofloxacin Other     tendinitis    Minocycline Hives    Penicillins Rash and Hives    Doxycycline Rash    Sulfa (Sulfonamide Antibiotics) Rash        SOCIAL HISTORY    Family History   Problem Relation Name Age of Onset    Multiple sclerosis Mother Monalisa Mata     Depression Mother Monalisa Mata     Hernia Mother Monalisa Mata     Cancer Father Enrique Mata     Depression Father Enrique Mata     Diabetes Father Enrique Mata     Early natural death Father Enrique Mata     Stroke Maternal Grandmother Delfina Ruth     Breast cancer Father's Sister Dayanara Santana     Depression Brother Ronnie Mata          Social History     Tobacco Use    Smoking status: Never    Smokeless tobacco: Never   Substance Use Topics    Alcohol use: Not Currently     Comment: Socially        VITALS  Vitals:    06/10/25 1506   BP: 123/87   Pulse: 108   Resp: 18   Temp: 37  °C (98.6 °F)            PHYSICAL EXAM  Patient is alert and oriented x3, with appropriate mood. Her gait is steady and hand grasps are equal. Sclera clear. The breasts are large and fairly symmetrical. The right breast with 3 healed incisions, superior central, central lateral near areola and partial circumareolar. I am not able to appreciate any measurable masses however she does have breast tenderness central lateral of bilateral breasts. The left tissue is soft without palpable abnormalities, discrete nodules or masses. The skin and nipples appear normal, no skin cyst noted, no nipple discharge. There is no cervical, supraclavicular, or axillary lymphadenopathy palpable. Heart rate and rhythm normal, S1 and S2 appreciated. The lungs are clear bilaterally.     Physical Exam  Chest:              IMAGING      Time was spent viewing digital images of the radiology testing with the patient. I explained the results in depth, along with suggested explanation for follow up recommendations based on the testing results.      RISK PROFILE      ORDERS  Orders Placed This Encounter   Procedures    BI mammo bilateral screening tomosynthesis     Standing Status:   Future     Expected Date:   6/12/2026     Expiration Date:   8/10/2026     Perform a breast ultrasound if clinically indicated by Radiologist?:   Yes     Previous Mamm performed at  location?:   Yes     Reason for exam::   screening, fam hx TC 36%     Radiologist to Determine Optimal Study:   Yes     Release result to [employer]:   Immediate     Is this exam part of a Research Study? If Yes, link this order to the research study:   No     Is the patient pregnant?:   No           ASSESSMENT/PLAN  1. Encounter for screening mammogram for malignant neoplasm of breast  BI mammo bilateral screening tomosynthesis    Clinic Appointment Request      2. Fibroadenoma of both breasts  Clinic Appointment Request    BI mammo bilateral screening tomosynthesis      3. Breast cancer  screening, high risk patient  BI mammo bilateral screening tomosynthesis    Clinic Appointment Request               Follow up in about 1 year (around 6/10/2026) for with or after recommended imaging. We discussed plan for bilateral screening mammogram in one year if stable and decide future timing/spacing  HIGH RISK PLAN  Clinical breast examinations  Yearly mammogram with digital breast tomosynthesis unless instructed differently  Breast MRI is an additional screening tool ([phone] to schedule)   Maintain a healthy weight (BMI 19-25), obesity increases risk of breast cancer  Vitamin D3 2000 IU daily or dose recommended by your provider  Limit alcohol intake to no more than 3-4 drinks/week or less  Healthy diet for cancer prevention, low fat, lean proteins, many fruits & vegetables  Moderate exercise - Goal is 150 minutes of exercise a week  Do NOT use any tobacco products (such as cigarettes, electronic cigarettes, vaping, cigars)  Be aware of how your breasts feel & report any concerns or changes    Risk model indicate personal risk of breast cancer in the next 5 years and lifetime (age 85-90):  [place]: 5-year risk 0.7% (average 0.2%), lifetime risk 36%, (average 11.2%)          Delisa Marvin, HERMELINDO-[facility]

## 2025-06-08 DIAGNOSIS — K21.9 GASTROESOPHAGEAL REFLUX DISEASE, UNSPECIFIED WHETHER ESOPHAGITIS PRESENT: ICD-10-CM

## 2025-06-10 ENCOUNTER — OFFICE VISIT (OUTPATIENT)
Dept: SURGICAL ONCOLOGY | Facility: CLINIC | Age: 30
End: 2025-06-10
Payer: COMMERCIAL

## 2025-06-10 VITALS
HEART RATE: 108 BPM | TEMPERATURE: 98.6 F | WEIGHT: 234.2 LBS | DIASTOLIC BLOOD PRESSURE: 87 MMHG | RESPIRATION RATE: 18 BRPM | BODY MASS INDEX: 39.02 KG/M2 | HEIGHT: 65 IN | SYSTOLIC BLOOD PRESSURE: 123 MMHG

## 2025-06-10 DIAGNOSIS — D24.1 FIBROADENOMA OF BOTH BREASTS: ICD-10-CM

## 2025-06-10 DIAGNOSIS — Z12.39 BREAST CANCER SCREENING, HIGH RISK PATIENT: ICD-10-CM

## 2025-06-10 DIAGNOSIS — Z12.31 ENCOUNTER FOR SCREENING MAMMOGRAM FOR MALIGNANT NEOPLASM OF BREAST: Primary | ICD-10-CM

## 2025-06-10 DIAGNOSIS — D24.2 FIBROADENOMA OF BOTH BREASTS: ICD-10-CM

## 2025-06-10 PROCEDURE — 3008F BODY MASS INDEX DOCD: CPT | Performed by: NURSE PRACTITIONER

## 2025-06-10 PROCEDURE — 99214 OFFICE O/P EST MOD 30 MIN: CPT | Performed by: NURSE PRACTITIONER

## 2025-06-10 PROCEDURE — 1036F TOBACCO NON-USER: CPT | Performed by: NURSE PRACTITIONER

## 2025-06-10 ASSESSMENT — PAIN SCALES - GENERAL: PAINLEVEL_OUTOF10: 0-NO PAIN

## 2025-06-11 RX ORDER — OMEPRAZOLE 40 MG/1
CAPSULE, DELAYED RELEASE ORAL
Qty: 90 CAPSULE | Refills: 2 | Status: SHIPPED | OUTPATIENT
Start: 2025-06-11

## 2025-06-18 ENCOUNTER — OFFICE VISIT (OUTPATIENT)
Facility: CLINIC | Age: 30
End: 2025-06-18
Payer: COMMERCIAL

## 2025-06-18 VITALS
HEIGHT: 65 IN | DIASTOLIC BLOOD PRESSURE: 90 MMHG | HEART RATE: 100 BPM | SYSTOLIC BLOOD PRESSURE: 127 MMHG | WEIGHT: 230 LBS | BODY MASS INDEX: 38.32 KG/M2 | OXYGEN SATURATION: 97 %

## 2025-06-18 DIAGNOSIS — K13.79 MOUTH SORES: ICD-10-CM

## 2025-06-18 DIAGNOSIS — E55.9 VITAMIN D DEFICIENCY: ICD-10-CM

## 2025-06-18 DIAGNOSIS — M06.09 POLYARTHRITIS WITH NEGATIVE RHEUMATOID FACTOR (MULTI): Primary | ICD-10-CM

## 2025-06-18 DIAGNOSIS — Z82.0 FAMILY HISTORY OF MS (MULTIPLE SCLEROSIS): ICD-10-CM

## 2025-06-18 PROCEDURE — 99214 OFFICE O/P EST MOD 30 MIN: CPT | Performed by: INTERNAL MEDICINE

## 2025-06-18 PROCEDURE — 3008F BODY MASS INDEX DOCD: CPT | Performed by: INTERNAL MEDICINE

## 2025-06-18 RX ORDER — ERGOCALCIFEROL 1.25 MG/1
50000 CAPSULE ORAL
Qty: 12 CAPSULE | Refills: 1 | Status: SHIPPED | OUTPATIENT
Start: 2025-06-22 | End: 2025-09-20

## 2025-06-18 RX ORDER — FAMOTIDINE 40 MG/1
40 TABLET, FILM COATED ORAL 2 TIMES DAILY
Qty: 180 TABLET | Refills: 3 | Status: SHIPPED | OUTPATIENT
Start: 2025-06-18 | End: 2026-06-18

## 2025-06-18 RX ORDER — HYDROXYCHLOROQUINE SULFATE 200 MG/1
200 TABLET, FILM COATED ORAL 2 TIMES DAILY
Qty: 180 TABLET | Refills: 3 | Status: SHIPPED | OUTPATIENT
Start: 2025-06-18

## 2025-06-18 ASSESSMENT — COLUMBIA-SUICIDE SEVERITY RATING SCALE - C-SSRS
6. HAVE YOU EVER DONE ANYTHING, STARTED TO DO ANYTHING, OR PREPARED TO DO ANYTHING TO END YOUR LIFE?: NO
2. HAVE YOU ACTUALLY HAD ANY THOUGHTS OF KILLING YOURSELF?: NO
1. IN THE PAST MONTH, HAVE YOU WISHED YOU WERE DEAD OR WISHED YOU COULD GO TO SLEEP AND NOT WAKE UP?: NO

## 2025-06-18 ASSESSMENT — ROUTINE ASSESSMENT OF PATIENT INDEX DATA (RAPID3)
FEELINGS_DEPRESSION: WITH SOME DIFFICULTY
IN_OUT_BED: WITHOUT ANY DIFFICULTY
ON A SCALE OF ONE TO TEN, CONSIDERING ALL THE WAYS IN WHICH ILLNESS AND HEALTH CONDITIONS MAY AFFECT YOU AT THIS TIME, PLEASE INDICATE BELOW HOW YOU ARE DOING:: 4
DRESS_YOURSELF: WITHOUT ANY DIFFICULTY
ON A SCALE OF ONE TO TEN, CONSIDERING ALL THE WAYS IN WHICH ILLNESS AND HEALTH CONDITIONS MAY AFFECT YOU AT THIS TIME, PLEASE INDICATE BELOW HOW YOU ARE DOING:: 4
SEVERITY_SCORE: MODERATE SEVERITY (MS)
WALK_FLAT_GROUND: WITH SOME DIFFICULTY
WASH_DRY_BODY: WITH SOME DIFFICULTY
PICK_CLOTHES_OFF_FLOOR: WITH SOME DIFFICULTY
TOTAL RAPID3 SCORE: 8.7
TURN_FAUCETS_OFF: WITHOUT ANY DIFFICULTY
FEELINGS_ANXIETY_NERVOUS: WITH SOME DIFFICULTY
FN_SCORE: 2.7
PARTIPATE_RECREATIONAL_ACTIVITIES: WITH MUCH DIFFICULTY
ON A SCALE OF ONE TO TEN, HOW MUCH PAIN HAVE YOU HAD BECAUSE OF YOUR CONDITION OVER THE PAST WEEK?: 2
LIFT_CUP_TO_MOUTH: WITHOUT ANY DIFFICULTY
WALK_KILOMETERS: UNABLE TO DO
WEIGHTED_TOTAL_SCORE: 2.9
IN_OUT_TRANSPORT: WITHOUT ANY DIFFICULTY
ON A SCALE OF ONE TO TEN, HOW MUCH PAIN HAVE YOU HAD BECAUSE OF YOUR CONDITION OVER THE PAST WEEK?: 2
SUM OF QUESTIONS A TO J: 8
GOOD_NIGHTS_SLEEP: WITH SOME DIFFICULTY
SEVERITY_SCORE: 0

## 2025-06-18 ASSESSMENT — PAIN SCALES - GENERAL: PAINLEVEL_OUTOF10: 3

## 2025-06-18 NOTE — PROGRESS NOTES
[facility]/  Rheumatology  [facility], [address]  [place] [address]  Phone: [phone]  Fax: [phone]    Rheumatology Progress Note 3/20/24    Chanel Mata is a 29 y.o. female here for   Chief Complaint   Patient presents with    Follow-up       Last Visit: 10/24/24    Rheum Hx      Previous Tx    Health Maintenance  DXA  TB testing  Hep B  HepC  HIV  HSV  Lyme  COVID  Malignancy Hx  Immunization History   Administered Date(s) Administered    COVID-19, subunit, rS-nanoparticle, adjuvanted, PF, 5 mcg/0.5 mL 10/01/2024    HPV, Quadrivalent 11/02/2010, 03/03/2011    Influenza, Unspecified 09/16/2024    Influenza, injectable, quadrivalent 10/11/2018, 09/01/2021    Influenza, seasonal, injectable 01/11/2017    Moderna COVID-19 vaccine, 12 years and older (50mcg/0.5mL)(Spikevax) 10/17/2023    Moderna COVID-19 vaccine, bivalent, blue cap/gray label *Check age/dose* 09/07/2022    Moderna SARS-CoV-2 Vaccination 02/04/2021, 03/04/2021, 12/01/2021    Tdap vaccine, age 7 year and older (BOOSTRIX, ADACEL) 06/28/2011, 10/25/2024          Past Medical History:   Diagnosis Date    Allergic     Anxiety     Chronic post-traumatic stress disorder     Depression     GERD (gastroesophageal reflux disease)     Lupus     Migraine     Nontoxic diffuse goiter     PCOS (polycystic ovarian syndrome)     Personal history of other mental and behavioral disorders 08/23/2017    History of anxiety disorder    Personal history of other specified conditions 08/23/2017    History of headache    PTSD (post-traumatic stress disorder)       Past Surgical History:   Procedure Laterality Date    BREAST LUMPECTOMY  06/21/2018    Breast Surgery Lumpectomy, x3    ELECTROMYOGRAPHY  10/2024    INNER EAR SURGERY  08/23/2017    Inner Ear Surgery    TONSILLECTOMY  08/23/2017    Tonsillectomy      Current Outpatient Medications   Medication Sig Dispense Refill    cholecalciferol (Vitamin D-3) 50 MCG (2000 UT) tablet  "Take 1 tablet (50 mcg) by mouth once daily.      hydroxychloroquine (Plaquenil) 200 mg tablet TAKE 1 TABLET BY MOUTH TWICE A DAY 60 tablet 11    hydrOXYzine HCL (Atarax) 25 mg tablet Take 1 tablet (25 mg) by mouth if needed. PRN      levonorgestreL-ethinyl estrad (Aviane, Alesse, Lessina) 0.1-20 mg-mcg tablet Take 1 tablet by mouth once daily. 28 tablet 12    omeprazole (PriLOSEC) 40 mg DR capsule TAKE 1 CAPSULE BY MOUTH EVERY DAY 30 MINUTES BEFORE MORNING MEAL FOR 30 DAYS 90 capsule 2    prazosin (Minipress) 1 mg capsule once every 24 hours.      predniSONE (Deltasone) 10 mg tablet Take 1 tablet (10 mg) by mouth once daily. 30 tablet 1    propranolol (Inderal) 10 mg tablet Take 1 tablet (10 mg) by mouth 2 times a day as needed.      semaglutide 1 mg/0.2 mL syringe Inject 1 mg combined under the skin 1 (one) time per week.      sertraline (Zoloft) 50 mg tablet TAKE 1/2 TABLET BY MOUTH = DAILY X 6 DAYS, THEN INCREASE TO 1 TABLET BY MOUTH DAILY      Vyvanse 60 mg capsule once every 24 hours.       No current facility-administered medications for this visit.      Allergies   Allergen Reactions    Ciprofloxacin Other     tendinitis    Minocycline Hives    Penicillins Rash and Hives    Doxycycline Rash    Sulfa (Sulfonamide Antibiotics) Rash        Visit Vitals  /90 (BP Location: Right arm, Patient Position: Sitting, BP Cuff Size: Large adult)   Pulse 100   Ht 1.651 m (5' 5\")   Wt 104 kg (230 lb)   SpO2 97%   BMI 38.27 kg/m²   OB Status Having periods   Smoking Status Never   BSA 2.18 m²            Rapid 3  Function Score (FN): 2.7  Pain Score (PN) (0-10): 2  Patient Global (PTGL) (0-10): 4  Rapid3 Score: 8.7  RAPID3 Weighted Score: 2.9   Physical Exam    Workup  Component      Latest Ref Rng 10/8/2024   WBC      4.4 - 11.3 x10*3/uL 9.3    nRBC      0.0 - 0.0 /100 WBCs 0.0    RBC      4.00 - 5.20 x10*6/uL 4.91    HEMOGLOBIN      12.0 - 16.0 g/dL 14.2    HEMATOCRIT      36.0 - 46.0 % 43.3    MCV      80 - 100 fL 88  "   MCH      26.0 - 34.0 pg 28.9    MCHC      32.0 - 36.0 g/dL 32.8    RED CELL DISTRIBUTION WIDTH      11.5 - 14.5 % 12.4    Platelets      150 - 450 x10*3/uL 266    Neutrophils %      40.0 - 80.0 % 49.3    Immature Granulocytes %, Automated      0.0 - 0.9 % 0.5    Lymphocytes %      13.0 - 44.0 % 40.5    Monocytes %      2.0 - 10.0 % 7.8    Eosinophils %      0.0 - 6.0 % 1.7    Basophils %      0.0 - 2.0 % 0.2    Neutrophils Absolute      1.20 - 7.70 x10*3/uL 4.57    Immature Granulocytes Absolute, Automated      0.00 - 0.70 x10*3/uL 0.05    Lymphocytes Absolute      1.20 - 4.80 x10*3/uL 3.77    Monocytes Absolute      0.10 - 1.00 x10*3/uL 0.73    Eosinophils Absolute      0.00 - 0.70 x10*3/uL 0.16    Basophils Absolute      0.00 - 0.10 x10*3/uL 0.02    GLUCOSE      74 - 99 mg/dL 97    SODIUM      136 - 145 mmol/L 139    POTASSIUM      3.5 - 5.3 mmol/L 4.2    CHLORIDE      98 - 107 mmol/L 103    Bicarbonate      21 - 32 mmol/L 27    Anion Gap      10 - 20 mmol/L 13    Blood Urea Nitrogen      6 - 23 mg/dL 12    Creatinine      0.50 - 1.05 mg/dL 0.67    EGFR      >60 mL/min/1.73m*2 >90    Calcium      8.6 - 10.6 mg/dL 9.5    Albumin      3.4 - 5.0 g/dL 4.2    Alkaline Phosphatase      33 - 110 U/L 78    Total Protein      6.4 - 8.2 g/dL 6.8    AST      9 - 39 U/L 28    Bilirubin Total      0.0 - 1.2 mg/dL 0.4    ALT      7 - 45 U/L 35    Color, Urine      Light-Yellow, Yellow, Dark-Yellow  Light-Yellow    Appearance, Urine      Clear  Clear    Specific Gravity, Urine      1.005 - 1.035  1.021    pH, Urine      5.0, 5.5, 6.0, 6.5, 7.0, 7.5, 8.0  5.5    Protein, Urine      NEGATIVE, 10 (TRACE), 20 (TRACE) mg/dL NEGATIVE    Glucose, Urine      Normal mg/dL Normal    Blood, Urine      NEGATIVE  0.06 (1+) !    Ketones, Urine      NEGATIVE mg/dL NEGATIVE    Bilirubin, Urine      NEGATIVE  NEGATIVE    Urobilinogen, Urine      Normal mg/dL Normal    Nitrite, Urine      NEGATIVE  NEGATIVE    Leukocyte Esterase, Urine       NEGATIVE  NEGATIVE    WBC, Urine      1-5, NONE /HPF 1-5    RBC, Urine      NONE, 1-2, 3-5 /HPF 3-5    Squamous Epithelial Cells, Urine      Reference range not established. /HPF 1-9 (SPARSE)    Mucus, Urine      Reference range not established. /LPF FEW    Hemoglobin A1C      See comment % 5.5    Estimated Average Glucose      Not Established mg/dL 111    C-Reactive Protein      <1.00 mg/dL 0.45    Creatine Kinase      0 - 215 U/L 124    Sed Rate      0 - 20 mm/h 5    Scan Result Vectra 27 (low)   Vitamin D, 25-Hydroxy, Total      30 - 100 ng/mL 31    Extra Tube Hold for add-ons.       Assessment/Plan  No diagnosis found.     No orders of the defined types were placed in this encounter.             Since last appt, adherent and tolerating  mg daily.   Mother dx with MS balance memory and stumbing, chronic migraines  Denies any recent or current infection.  Not on any NSAIDs or glucocorticoids.  Labs reviewed  D/w pt tx options Advised of possible side effects and importance of monitoring.   All questions answered.  Patient to follow up with primary care provider regarding all other medical issues not addressed today and for medical chart updating.        Since last appt, adherent and tolerating   EMG normal  Tilt table testing done- dx with POTS  Denies any recent or current infection.  Not on any NSAIDs or glucocorticoids.  ROS+ for   Rapid 3 consistent with   Labs reviewed  D/w pt tx options and decided on   Advised of possible side effects and importance of monitoring.   All questions answered.  Patient to follow up with primary care provider regarding all other medical issues not addressed today and for medical chart updating.         Since last appt, adherent and tolerating  Sparing   Denies any recent or current infection.  Not on any NSAIDs or glucocorticoids.  ROS+ for   Rapid 3 consistent with   Labs reviewed  D/w pt tx options and decided on   Advised of possible side effects and importance of  monitoring.   All questions answered.  Patient to follow up with primary care provider regarding all other medical issues not addressed today and for medical chart updating.         Since last appt, adherent and tolerating***  Denies any recent or current infection.  Not on any NSAIDs or glucocorticoids.  ROS+ for ***  Rapid 3 consistent with ***  Labs reviewed  D/w pt tx options and decided on ***  Advised of possible side effects and importance of monitoring.   All questions answered.  Patient to follow up with primary care provider regarding all other medical issues not addressed today and for medical chart updating.        Since last appt, adherent and tolerating  mg daily  Ibuprofen takes   Prednisone prn sparingly   If gets bmalar rash takes it  curbs flare. Taesk for about 4 days.  Min or salivary galand bx neg  Denies any recent or current infection.    ROS+ for ***  GERD if misses PPI by a couple of hours.  Semaglutide helping- lost 30 lb  Rapid 3 consistent with ***  Labs reviewed  D/w pt tx options and decided on ***  Advised of possible side effects and importance of monitoring.   All questions answered.  Patient to follow up with primary care provider regarding all other medical issues not addressed today and for medical chart updating.    Mar Fernandez MD      Patient Care Team:  Stephanie Felder PA-C as PCP - General (Internal Medicine)  HERMELINDO Dow-CNP as Primary Care Provider

## 2025-06-19 ENCOUNTER — SPECIALTY PHARMACY (OUTPATIENT)
Dept: PHARMACY | Facility: CLINIC | Age: 30
End: 2025-06-19

## 2025-06-19 DIAGNOSIS — M32.8 OTHER FORMS OF SYSTEMIC LUPUS ERYTHEMATOSUS, UNSPECIFIED ORGAN INVOLVEMENT STATUS (MULTI): Primary | ICD-10-CM

## 2025-06-19 RX ORDER — BELIMUMAB 200 MG/ML
200 SOLUTION SUBCUTANEOUS
Qty: 4 ML | Refills: 11 | Status: SHIPPED | OUTPATIENT
Start: 2025-06-19

## 2025-06-19 NOTE — PROGRESS NOTES
Per Dr. Greenfield - [facility] . Sending to [facility] for PA/PAP/Processing.

## 2026-04-17 ENCOUNTER — APPOINTMENT (OUTPATIENT)
Dept: PRIMARY CARE | Facility: CLINIC | Age: 31
End: 2026-04-17
Payer: COMMERCIAL